# Patient Record
Sex: MALE | Race: WHITE
[De-identification: names, ages, dates, MRNs, and addresses within clinical notes are randomized per-mention and may not be internally consistent; named-entity substitution may affect disease eponyms.]

---

## 2019-07-24 ENCOUNTER — HOSPITAL ENCOUNTER (OUTPATIENT)
Dept: HOSPITAL 75 - RAD | Age: 49
End: 2019-07-24
Attending: SURGERY
Payer: MEDICARE

## 2019-07-24 ENCOUNTER — HOSPITAL ENCOUNTER (OUTPATIENT)
Dept: HOSPITAL 75 - PREOP | Age: 49
Discharge: HOME | End: 2019-07-24
Attending: SURGERY
Payer: MEDICARE

## 2019-07-24 VITALS — WEIGHT: 160 LBS | HEIGHT: 67 IN | BODY MASS INDEX: 25.11 KG/M2

## 2019-07-24 DIAGNOSIS — Z01.818: Primary | ICD-10-CM

## 2019-07-24 DIAGNOSIS — K82.8: Primary | ICD-10-CM

## 2019-07-24 PROCEDURE — 76705 ECHO EXAM OF ABDOMEN: CPT

## 2019-07-24 NOTE — DIAGNOSTIC IMAGING REPORT
INDICATION:  Right upper quadrant abdominal pain.



TECHNIQUE: Multiple grayscale sonographic images were obtained of

the right upper quadrant of the abdomen.



CORRELATION STUDY:  None



FINDINGS: 

LIVER:  There is uniform echotexture within the visualized

portions of the liver.  Liver length is 16.2 cm.     

GALLBLADDER:  Echogenic foci within the gallbladder lumen. This

does not demonstrate appreciable enhancement may reflect small

amount of sludge or debris. Gallstone not excluded. There is

presence of gallbladder wall thickening at 4 mm. 

COMMON BILE DUCT:  Nondilated at 3 mm.  

PANCREAS:  Largely obscured.

RIGHT KIDNEY:  Measures 11.0 x 6.7 x 6.1 cm cm.  No

hydronephrosis. 

AORTA/IVC:  Not well visualized.

OTHER:   None.





IMPRESSION: 

1. Question of potential the gallstone versus sludge or debris.

There is presence of gallbladder wall thickening. Some of this

may be attributed to slightly contracted gallbladder. Gallbladder

wall edema with cholecystitis however is not excluded. If further

assessment is desired, HIDA scan would be recommended.







Dictated by: 



  Dictated on workstation # [id number]

## 2019-07-31 ENCOUNTER — HOSPITAL ENCOUNTER (OUTPATIENT)
Dept: HOSPITAL 75 - SDC | Age: 49
Discharge: HOME | End: 2019-07-31
Attending: SURGERY
Payer: MEDICARE

## 2019-07-31 VITALS — DIASTOLIC BLOOD PRESSURE: 83 MMHG | SYSTOLIC BLOOD PRESSURE: 141 MMHG

## 2019-07-31 VITALS — DIASTOLIC BLOOD PRESSURE: 85 MMHG | SYSTOLIC BLOOD PRESSURE: 133 MMHG

## 2019-07-31 VITALS — SYSTOLIC BLOOD PRESSURE: 134 MMHG | DIASTOLIC BLOOD PRESSURE: 84 MMHG

## 2019-07-31 VITALS — SYSTOLIC BLOOD PRESSURE: 133 MMHG | DIASTOLIC BLOOD PRESSURE: 85 MMHG

## 2019-07-31 VITALS — DIASTOLIC BLOOD PRESSURE: 84 MMHG | SYSTOLIC BLOOD PRESSURE: 143 MMHG

## 2019-07-31 VITALS — SYSTOLIC BLOOD PRESSURE: 129 MMHG | DIASTOLIC BLOOD PRESSURE: 93 MMHG

## 2019-07-31 VITALS — SYSTOLIC BLOOD PRESSURE: 134 MMHG | DIASTOLIC BLOOD PRESSURE: 85 MMHG

## 2019-07-31 VITALS — DIASTOLIC BLOOD PRESSURE: 87 MMHG | SYSTOLIC BLOOD PRESSURE: 142 MMHG

## 2019-07-31 VITALS — SYSTOLIC BLOOD PRESSURE: 136 MMHG | DIASTOLIC BLOOD PRESSURE: 86 MMHG

## 2019-07-31 VITALS — SYSTOLIC BLOOD PRESSURE: 135 MMHG | DIASTOLIC BLOOD PRESSURE: 81 MMHG

## 2019-07-31 VITALS — SYSTOLIC BLOOD PRESSURE: 137 MMHG | DIASTOLIC BLOOD PRESSURE: 86 MMHG

## 2019-07-31 VITALS — HEIGHT: 67 IN | BODY MASS INDEX: 25.11 KG/M2 | WEIGHT: 160 LBS

## 2019-07-31 VITALS — SYSTOLIC BLOOD PRESSURE: 140 MMHG | DIASTOLIC BLOOD PRESSURE: 84 MMHG

## 2019-07-31 DIAGNOSIS — I85.00: ICD-10-CM

## 2019-07-31 DIAGNOSIS — Z80.0: ICD-10-CM

## 2019-07-31 DIAGNOSIS — K74.60: ICD-10-CM

## 2019-07-31 DIAGNOSIS — K64.1: ICD-10-CM

## 2019-07-31 DIAGNOSIS — Z79.899: ICD-10-CM

## 2019-07-31 DIAGNOSIS — K80.10: ICD-10-CM

## 2019-07-31 DIAGNOSIS — Z12.11: Primary | ICD-10-CM

## 2019-07-31 DIAGNOSIS — K29.50: ICD-10-CM

## 2019-07-31 DIAGNOSIS — E03.9: ICD-10-CM

## 2019-07-31 DIAGNOSIS — I10: ICD-10-CM

## 2019-07-31 DIAGNOSIS — Z79.891: ICD-10-CM

## 2019-07-31 DIAGNOSIS — D36.0: ICD-10-CM

## 2019-07-31 DIAGNOSIS — K64.8: ICD-10-CM

## 2019-07-31 DIAGNOSIS — K21.9: ICD-10-CM

## 2019-07-31 LAB
BASOPHILS # BLD AUTO: 0 10^3/UL (ref 0–0.1)
BASOPHILS NFR BLD AUTO: 0 % (ref 0–10)
EOSINOPHIL # BLD AUTO: 0 10^3/UL (ref 0–0.3)
EOSINOPHIL NFR BLD AUTO: 0 % (ref 0–10)
ERYTHROCYTE [DISTWIDTH] IN BLOOD BY AUTOMATED COUNT: 13 % (ref 10–14.5)
HCT VFR BLD CALC: 27 % (ref 40–54)
HCT VFR BLD CALC: 33 % (ref 40–54)
HGB BLD-MCNC: 12.2 G/DL (ref 13.3–17.7)
HGB BLD-MCNC: 9.7 G/DL (ref 13.3–17.7)
LYMPHOCYTES # BLD AUTO: 0.9 X 10^3 (ref 1–4)
LYMPHOCYTES NFR BLD AUTO: 15 % (ref 12–44)
MANUAL DIFFERENTIAL PERFORMED BLD QL: NO
MCH RBC QN AUTO: 37 PG (ref 25–34)
MCHC RBC AUTO-ENTMCNC: 37 G/DL (ref 32–36)
MCV RBC AUTO: 100 FL (ref 80–99)
MONOCYTES # BLD AUTO: 0.7 X 10^3 (ref 0–1)
MONOCYTES NFR BLD AUTO: 11 % (ref 0–12)
NEUTROPHILS # BLD AUTO: 4.4 X 10^3 (ref 1.8–7.8)
NEUTROPHILS NFR BLD AUTO: 74 % (ref 42–75)
PLATELET # BLD: 93 10^3/UL (ref 130–400)
PMV BLD AUTO: 13.1 FL (ref 7.4–10.4)
WBC # BLD AUTO: 6 10^3/UL (ref 4.3–11)

## 2019-07-31 PROCEDURE — 85025 COMPLETE CBC W/AUTO DIFF WBC: CPT

## 2019-07-31 PROCEDURE — 85014 HEMATOCRIT: CPT

## 2019-07-31 PROCEDURE — 36415 COLL VENOUS BLD VENIPUNCTURE: CPT

## 2019-07-31 PROCEDURE — 88342 IMHCHEM/IMCYTCHM 1ST ANTB: CPT

## 2019-07-31 PROCEDURE — 88304 TISSUE EXAM BY PATHOLOGIST: CPT

## 2019-07-31 PROCEDURE — 87081 CULTURE SCREEN ONLY: CPT

## 2019-07-31 PROCEDURE — 88305 TISSUE EXAM BY PATHOLOGIST: CPT

## 2019-07-31 PROCEDURE — 43239 EGD BIOPSY SINGLE/MULTIPLE: CPT

## 2019-07-31 PROCEDURE — 47562 LAPAROSCOPIC CHOLECYSTECTOMY: CPT

## 2019-07-31 PROCEDURE — 85018 HEMOGLOBIN: CPT

## 2019-07-31 RX ADMIN — SODIUM CHLORIDE, SODIUM LACTATE, POTASSIUM CHLORIDE, AND CALCIUM CHLORIDE PRN MLS/HR: 600; 310; 30; 20 INJECTION, SOLUTION INTRAVENOUS at 10:22

## 2019-07-31 RX ADMIN — SODIUM CHLORIDE, SODIUM LACTATE, POTASSIUM CHLORIDE, AND CALCIUM CHLORIDE PRN MLS/HR: 600; 310; 30; 20 INJECTION, SOLUTION INTRAVENOUS at 08:44

## 2019-07-31 NOTE — OPERATIVE REPORT
DATE OF SERVICE:  07/31/2019



ATTENDING PRIMARY CARE PHYSICIAN:

Dr. Merlin Hernadez.



PREOPERATIVE DIAGNOSES:

1.  Symptomatic chronic calculous cholecystitis.

2.  Gastroesophageal reflux disease, screening colonoscopy.



POSTOPERATIVE DIAGNOSES:

Chronic calculous cholecystitis, diffuse liver cirrhosis, grade III esophageal

varices, moderate to severe gastritis, mild chronic stage II external and

internal hemorrhoids.



PROCEDURE:

Laparoscopic cholecystectomy, EGD with biopsy, colonoscopy.



SURGEON:

Vanna Erazo MD



ANESTHESIA:

Conscious sedation.



ESTIMATED BLOOD LOSS:

900 mL.



FINDINGS:

Diffuse liver cirrhosis, multiple gallstones, grade III esophageal varices,

moderate to severe gastritis.  No active bleeding.  Chronic stage II external

and internal hemorrhoids.  Remainder of the colon and rectum were normal.



DISPOSITION:

The patient tolerated the procedure well.



INDICATIONS:

The patient is a 49-year-old male referred over to us for pain in the right

upper abdominal quadrant with associated intermittent episodes of nausea and

vomiting.  He also does report a history of gastroesophageal reflux disease.  An

ultrasound was performed, which did show biliary sludge.  He also is in need of

a screening colonoscopy and has not had one before up to this point in his life.

 Of note, this gentleman does drink a significant amount of alcohol and reports

that he has drank approximately 20 beers daily for the past 30 years.



DESCRIPTION OF PROCEDURE:

The patient was brought to the operating room, laid supine on the table.  After

adequate IV pain and sedative medications and general endotracheal intubation,

the abdomen was prepped and draped in standard surgical fashion.  Left upper

abdominal quadrant and a transverse skin incision made using a 15 blade.  An 0

silk suture was applied to the medial aspect of the incision for retraction and

a Veress needle inserted with a low opening pressure of 0 mmHg and the abdomen

was then insufflated to 15 mmHg pressure.  The Veress needle removed and a 5 mm

XL trocar placed followed by a 5 mm 45-degree angle laparoscope visualizing the

peritoneal cavity.



A 4-quadrant abdominal exploration was performed.  There was diffuse liver,

micronodular liver cirrhosis.  There was slightly distended gallbladder, no

gallbladder wall thickening.  Under direct visualization, we then proceeded to

place a supraumbilical 10 mm port after the skin an peritoneal lining were 
anesthetized

using 0.5% Marcaine with epinephrine and a transverse skin incision made using a

15 blade.  In a similar manner, a right upper abdominal quadrant 5 mm port was

placed.



The patient was then placed in the reverse Trendelenburg position as well as

plane right side up, left side down.  The fundus of the gallbladder was then

retracted anteriorly and superiorly.  The hepatoduodenal ligament was then

opened and dissected using blunt dissection as well as electrocautery on the

hook instrument.  The entire critical view of safety was identified including

the triangle of Calot as well as the cystic duct and artery as the only two

structures going into the gallbladder as well as the cystic plate behind the

proximal gallbladder.  While dissecting the cystic artery, we did run into

bleeding, which was controlled with a clip and fibrin glue.  After bleeding,

good hemostasis was observed.  We proceeded with our dissection of the clipping

of the cystic duct and artery proximally, distally and cutting them with

EndoShears.  The gallbladder was then dissected off the liver bed using cautery

on the hook instrument with visualization of good hemostasis as well as no

leaking ducts of Luschka.  The gallbladder was removed through the 10 mm port

site using an EndoCatch bag.  Approximately 900 mL of blood loss was encompassed

which was suctioned out.  Post-procedure, CBC was within stable limits at 9.7.



The gallbladder was removed through the 10 mm port site using an EndoCatch bag. 

The gallbladder was examined ex-vivo on the back table with small stones

identified.  The 10 mm port site fascia and peritoneum were then closed under

direct visualization using a Matheus-Kristine device and 0 Vicryl suture.  The

abdomen was then desufflated and remaining ports removed.  All skin incisions

were closed using 4-0 Monocryl running subcuticular sutures.  Wounds were then

cleaned and covered with Dermabond.



We then proceeded with the EGD.  The mouthpiece was applied.  The endoscope was

placed in the mouth, visualizing the pharynx and hypopharyngeal region.  Vocal

cords, epiglottis and vallecula identified and appeared to be normal.  The

endoscope was then gently intubated at the esophageal opening and esophagus

insufflated.  The endoscope was then advanced to the first, second and third

portion of esophagus.  At the distal esophagus, there were grade III esophageal

varices identified.  These were not actively bleeding.  The endoscope was placed

past the lower esophagus and the endoscope retroflexed visualizing no hiatal

hernia.  There was a mild to moderate gastritis.  No formal ulcerations, polyps

or any neoplasms.  A biopsy was taken of the antrum to rule out H. pylori with

visualization of good hemostasis.  The endoscope was then advanced to the

pylorus and the first and second portion of the duodenum, which appeared normal

with no ulcerations or any distal obstructions.  The endoscope was then slowly

withdrawn while taking a second look and suctioning of residual air with no

additional findings.



Under the same anesthesia, we then proceeded with the colonoscopy portion of the

procedure and the patient was placed in frog leg position.  A digital rectal

examination was performed, which revealed chronic stage II external and internal

hemorrhoids, not actively edematous nor inflamed and no bleeding.  Normal

sphincter tone was felt and there were no palpable masses.  Prostate gland was

palpable and appeared normal.



The endoscope was then intubated to the anus and rectum gently insufflated.  The

endoscope was then advanced to the valves of Richards of the rectum with no

polyps or any neoplasms identified.  We then proceeded through the sigmoid colon

where no diverticulosis identified.  The endoscope was then advanced to the

remainder of the descending, transverse and ascending colon to the cecum.  These

segments were normal.  There were no polyps or any neoplasms identified

throughout the colon or rectum.  The endoscope was then slowly withdrawn while

taking a second look and suctioning of residual air with no additional findings.



The patient tolerated the procedure well.  We will recommend that he proceed

with further medical therapy for his liver cirrhosis encompassing alcohol

cessation as well as being referred to hepatology for delineation of modified

Child-Diehl classification as well as his MELD score.  He does have significant

grade III esophageal varices as well, which will need to be further evaluated

and potentially decompressed with a portosystemic shunt.  If he does abstain

from alcohol and he does meet the criteria for liver transplantation, he may

potentially be placed on a list. 





Job ID: [id number]

DocumentID: [id number]

Dictated Date:  07/31/2019 15:30:14

Transcription Date: 07/31/2019 20:17:34

Dictated By: VANNA ERAZO MD

MTDD

## 2019-07-31 NOTE — ANESTHESIA-GENERAL POST-OP
General


Patient Condition


Mental Status/LOC:  Same as Preop


Cardiovascular:  Satisfactory


Nausea/Vomiting:  Absent


Respiratory:  Satisfactory


Pain:  Controlled


Complications:  Absent





Post Op Complications


Complications


None





Follow Up Care/Instructions


Patient Instructions


None needed.





Anesthesia/Patient Condition


Patient Condition


Patient is doing well, C/O some pain which is expected, stable vital signs, no 

apparent adverse anesthesia problems.











SHILO CLARKE DO         Jul 31, 2019 14:05

## 2019-07-31 NOTE — PROGRESS NOTE-POST OPERATIVE
Post-Operative Progess Note


Surgeon (s)/Assistant (s)


Surgeon


Dr. Herbert Erazo M.D.


Assistant:  Cruz Clement APRN





Pre-Operative Diagnosis


Chronic Calculous cholecystitis, Reflux, Screening colonoscopy





Post-Operative Diagnosis





Chronic Calculous cholecystitis, Grade III esophageal Varices, moderate to


severe gastritis, Chronic stage II external and internal hemorrhoids





Procedure & Operative Findings


Date of Procedure


7/31/19


Procedure Performed/Findings


Laparoscopic cholecystectomy, EGD with biopsy, colonoscopy


Anesthesia Type


GET





Estimated Blood Loss


Estimated blood loss (mL):  900 ml





Specimens/Packing


Specimens Removed


1) Gallbladder


2) Antrum











CRUZ CLEMENT APRN          Jul 31, 2019 11:47
90

## 2019-07-31 NOTE — DISCHARGE INST-SURGICAL
D/C Lap Instructions-KIDO


Reconcile Patient Problems


Problems Reviewed?:  Yes


New, Converted, or Re-Newed RX:  RX on Chart


Follow Up Appt in 2 weeks





Activity as tolerated


No driving for 24 hours


No driving while on pain medications





Incentive Spirometry use every 2 hours while awake





Regular Diet


High Fiber Diet 25g or more per day


Avoid Alcohol, Caffeine, Spicy Greasy and Acid foods.


Drink 64 fluid oz or more of fluids per day.





Symptoms to Report: Fever over 101 degree F, Nausea/Vomiting 


Infection Signs and Symptoms to report:  Increased redness, Foul odor of wound, 

Increased drainage





Bathing instructions: May shower


Operative Area Clean/Dry;  Keep incision clean/dry





Symptoms to Report: Fever over 101 degree F, Nausea/Vomiting 


If any problems/questions: Contact your physician or go to Emergency Room











STEFAN CLEMENT          Jul 31, 2019 09:29

## 2019-07-31 NOTE — PROGRESS NOTE-PRE OPERATIVE
Pre-Operative Progress Note


H&P Reviewed


The H&P was reviewed, patient examined and no changes noted.


Date Seen by Provider:  Jul 31, 2019


Time Seen by Provider:  09:20


Date H&P Reviewed:  Jul 31, 2019


Time H&P Reviewed:  09:15


Pre-Operative Diagnosis:  Chronic Calculous cholecystitis, Reflux, Screening 

colonoscopy











STEFAN CLEMENT          Jul 31, 2019 09:24
no

## 2019-10-04 ENCOUNTER — HOSPITAL ENCOUNTER (OUTPATIENT)
Dept: HOSPITAL 75 - RAD FS | Age: 49
End: 2019-10-04
Attending: FAMILY MEDICINE
Payer: MEDICARE

## 2019-10-04 DIAGNOSIS — K70.30: Primary | ICD-10-CM

## 2019-10-04 DIAGNOSIS — K63.89: ICD-10-CM

## 2019-10-04 PROCEDURE — 74022 RADEX COMPL AQT ABD SERIES: CPT

## 2020-10-21 ENCOUNTER — HOSPITAL ENCOUNTER (EMERGENCY)
Dept: HOSPITAL 75 - ER FS | Age: 50
Discharge: HOME | End: 2020-10-21
Payer: MEDICARE

## 2020-10-21 VITALS — DIASTOLIC BLOOD PRESSURE: 78 MMHG | SYSTOLIC BLOOD PRESSURE: 161 MMHG

## 2020-10-21 VITALS — BODY MASS INDEX: 25.47 KG/M2 | HEIGHT: 66.02 IN | WEIGHT: 158.51 LBS

## 2020-10-21 DIAGNOSIS — E03.9: ICD-10-CM

## 2020-10-21 DIAGNOSIS — H40.9: ICD-10-CM

## 2020-10-21 DIAGNOSIS — Z79.890: ICD-10-CM

## 2020-10-21 DIAGNOSIS — K21.9: ICD-10-CM

## 2020-10-21 DIAGNOSIS — R10.9: Primary | ICD-10-CM

## 2020-10-21 DIAGNOSIS — F17.210: ICD-10-CM

## 2020-10-21 DIAGNOSIS — E87.1: ICD-10-CM

## 2020-10-21 DIAGNOSIS — I10: ICD-10-CM

## 2020-10-21 DIAGNOSIS — Z87.19: ICD-10-CM

## 2020-10-21 DIAGNOSIS — K52.9: ICD-10-CM

## 2020-10-21 DIAGNOSIS — F10.20: ICD-10-CM

## 2020-10-21 LAB
ALBUMIN SERPL-MCNC: 4.7 GM/DL (ref 3.2–4.5)
ALP SERPL-CCNC: 93 U/L (ref 40–136)
ALT SERPL-CCNC: 99 U/L (ref 0–55)
APTT PPP: YELLOW S
BACTERIA #/AREA URNS HPF: (no result) /HPF
BARBITURATES UR QL: NEGATIVE
BASOPHILS # BLD AUTO: 0 10^3/UL (ref 0–0.1)
BASOPHILS NFR BLD AUTO: 0 % (ref 0–10)
BENZODIAZ UR QL SCN: NEGATIVE
BILIRUB SERPL-MCNC: 1.6 MG/DL (ref 0.1–1)
BILIRUB UR QL STRIP: NEGATIVE
BUN/CREAT SERPL: 8
CALCIUM SERPL-MCNC: 9.9 MG/DL (ref 8.5–10.1)
CHLORIDE SERPL-SCNC: 91 MMOL/L (ref 98–107)
CO2 SERPL-SCNC: 22 MMOL/L (ref 21–32)
COCAINE UR QL: NEGATIVE
CREAT SERPL-MCNC: 0.75 MG/DL (ref 0.6–1.3)
EOSINOPHIL # BLD AUTO: 0 10^3/UL (ref 0–0.3)
EOSINOPHIL NFR BLD AUTO: 1 % (ref 0–10)
FIBRINOGEN PPP-MCNC: CLEAR MG/DL
GFR SERPLBLD BASED ON 1.73 SQ M-ARVRAT: > 60 ML/MIN
GLUCOSE SERPL-MCNC: 121 MG/DL (ref 70–105)
GLUCOSE UR STRIP-MCNC: NEGATIVE MG/DL
HCT VFR BLD CALC: 38 % (ref 40–54)
HGB BLD-MCNC: 13.6 G/DL (ref 13.3–17.7)
KETONES UR QL STRIP: NEGATIVE
LEUKOCYTE ESTERASE UR QL STRIP: NEGATIVE
LIPASE SERPL-CCNC: 26 U/L (ref 8–78)
LYMPHOCYTES # BLD AUTO: 0.7 X 10^3 (ref 1–4)
LYMPHOCYTES NFR BLD AUTO: 23 % (ref 12–44)
MANUAL DIFFERENTIAL PERFORMED BLD QL: NO
MCH RBC QN AUTO: 35 PG (ref 25–34)
MCHC RBC AUTO-ENTMCNC: 36 G/DL (ref 32–36)
MCV RBC AUTO: 98 FL (ref 80–99)
METHADONE UR QL SCN: NEGATIVE
METHAMPHETAMINE SCREEN URINE S: NEGATIVE
MONOCYTES # BLD AUTO: 0.3 X 10^3 (ref 0–1)
MONOCYTES NFR BLD AUTO: 9 % (ref 0–12)
NEUTROPHILS # BLD AUTO: 2 X 10^3 (ref 1.8–7.8)
NEUTROPHILS NFR BLD AUTO: 66 % (ref 42–75)
NITRITE UR QL STRIP: NEGATIVE
OPIATES UR QL SCN: NEGATIVE
OXYCODONE UR QL: NEGATIVE
PH UR STRIP: 8 [PH] (ref 5–9)
PLATELET # BLD: 83 10^3/UL (ref 130–400)
PMV BLD AUTO: 11.4 FL (ref 7.4–10.4)
POTASSIUM SERPL-SCNC: 4.1 MMOL/L (ref 3.6–5)
PROPOXYPH UR QL: NEGATIVE
PROT SERPL-MCNC: 7.3 GM/DL (ref 6.4–8.2)
PROT UR QL STRIP: NEGATIVE
RBC #/AREA URNS HPF: (no result) /HPF
SODIUM SERPL-SCNC: 126 MMOL/L (ref 135–145)
SP GR UR STRIP: 1.01 (ref 1.02–1.02)
SQUAMOUS #/AREA URNS HPF: (no result) /HPF
TRICYCLICS UR QL SCN: NEGATIVE
WBC # BLD AUTO: 3 10^3/UL (ref 4.3–11)
WBC #/AREA URNS HPF: (no result) /HPF

## 2020-10-21 PROCEDURE — 83690 ASSAY OF LIPASE: CPT

## 2020-10-21 PROCEDURE — 80053 COMPREHEN METABOLIC PANEL: CPT

## 2020-10-21 PROCEDURE — 36415 COLL VENOUS BLD VENIPUNCTURE: CPT

## 2020-10-21 PROCEDURE — 80320 DRUG SCREEN QUANTALCOHOLS: CPT

## 2020-10-21 PROCEDURE — 80306 DRUG TEST PRSMV INSTRMNT: CPT

## 2020-10-21 PROCEDURE — 81000 URINALYSIS NONAUTO W/SCOPE: CPT

## 2020-10-21 PROCEDURE — 74176 CT ABD & PELVIS W/O CONTRAST: CPT

## 2020-10-21 PROCEDURE — 85025 COMPLETE CBC W/AUTO DIFF WBC: CPT

## 2020-10-21 NOTE — DIAGNOSTIC IMAGING REPORT
PROCEDURE: CT urinary tract, rule out kidney stone.



TECHNIQUE: Multiple contiguous axial images were obtained through

the abdomen and pelvis without the use of intravenous contrast.

Auto Exposure Controls were utilized during the CT exam to meet

ALARA standards for radiation dose reduction. 



INDICATION: Right flank pain.



COMPARISON: There are no prior CT examinations available for

comparison.



FINDINGS: There is no evidence for nephrolithiasis or

urolithiasis and the kidneys do not appear to be obstructed.

There is no solid renal mass identified but there does seem to be

a 2.8 cm cyst along the lateral aspect of the left kidney.



The appendix was not well visualized but there are no indirect

signs of acute appendicitis. The wall of the cecum and ascending

colon does seem somewhat thickened. This may be secondary to

incomplete distention as there is no distortion of the

pericolonic fat in this area to suggest colitis. Even so, the

possibility of mild colitis should still be considered.



There is a small amount of nonspecific free fluid in the pelvis.

This is of uncertain etiology. The bladder wall is thickened and

this appearance could be secondary to either cystitis or

incomplete distention. Correlation with the patient's urinalysis

would be recommended. The prostate gland does not appear to be

enlarged.



The liver, spleen, pancreas, adrenals, aorta, and inferior vena

cava show no sign of an acute abnormality. The gallbladder is

surgically absent. The stomach is not well distended and

difficult to assess. There does seem to be a small hiatal hernia.



The lung bases are clear.



The bone windows are unremarkable for a fracture or for a

destructive lesion. There is fairly severe degenerative disc and

bony disease at L5-S1.



IMPRESSION:

1. There is no evidence for nephrolithiasis or urolithiasis and

the kidneys do not appear to be obstructed.

2. The appendix is not well visualized but there are no indirect

signs of acute appendicitis.

3. The slightly thickened appearance of the wall of the cecum and

ascending colon may be secondary to incomplete distention. The

possibility that there is an element of mild colitis should also

be considered.

4. There is a small amount of nonspecific free fluid in the

pelvis.

5. The bladder wall thickening may also be related to incomplete

distention or less likely to cystitis.



Dictated by: 



  Dictated on workstation # [id number]

## 2020-10-21 NOTE — ED GENERAL
General


Stated Complaint:  BACK PAIN


Source of Information:  Patient, Old Records, RN/MD, RN Notes Reviewed





History of Present Illness


Date Seen by Provider:  Oct 21, 2020


Time Seen by Provider:  09:45


Initial Comments


This patient is a 50-year-old male presents to the emergency department 

complaining of right flank pain radiating to the right groin. Patient states he 

awakened this morning with this pain. Patient admits that he is a heavy drinker 

has been drinking most of his life is considered an alcoholic. Last drink was 

last night. Patient states he never had pain like this before. Patient denies 

any injury. Patient denies any fever. We'll do medical evaluation treatment is 

needed. Patient states she does have a history of liver problems concerning for 

possible cirrhosis of the liver.


Severity:  Moderate


Associated Systoms:  No Denies Symptoms, No Chest Pain, No Cough, No 

Diaphoresis, No Fever/Chills, No Headaches, No Loss of Appetite, No Malaise, No 

Nausea/Vomiting, No Rash, No Seizure, No Shortness of Air, No Syncope, No 

Weakness, No Other





Allergies and Home Medications


Allergies


Coded Allergies:  


     No Known Drug Allergies (Unverified , 7/24/19)





Home Medications


Bimatoprost 2.5 Ml Drops, 1 DROP OU DAILY, (Reported)


Hydrocodone Bit/Acetaminophen 1 Each Tablet, 1-2 TAB PO Q4H


   Prescribed by: STEFAN CLEMENT on 7/31/19 0926


Levothyroxine Sodium 50 Mcg Tablet, 50 MCG PO DAILY, (Reported)


Lisinopril/Hydrochlorothiazide 1 Each Tablet, 2 EACH PO DAILY, (Reported)


Pantoprazole Sodium 40 Mg Tablet.dr, 40 MG PO DAILY


   Prescribed by: STEFAN CLEMENT on 7/31/19 1149





Patient Home Medication List


Home Medication List Reviewed:  Yes





Review of Systems


Review of Systems


Constitutional:  No no symptoms reported, No see HPI, No chills, No diaphoresis,

No dizziness, No fever, No malaise, No weakness, No weight gain, No weight loss,

No other


EENTM:  No see HPI, No no symptoms reported, No ear discharge, No hearing loss, 

No ear pain, No blurred vision, No double vision, No eye pain, No tearing, No 

vision loss, No dental problems, No hoarseness, No mouth pain, No mouth 

swelling, No epistaxis, No nose congestion, No nose pain, No throat pain, No 

throat swelling, No other


Respiratory:  No no symptoms reported, No see HPI, No cough, No dyspnea on 

exertion, No hemoptysis, No orthopnea, No phlegm, No short of breath, No 

stridor, No wheezing, No other


Cardiovascular:  No no symptoms reported, No see HPI, No chest pain, No edema, 

No Hx of Intervention, No palpitations, No syncope, No vascular heart diseas, No

other


Gastrointestinal:  No RUQ, No LUQ, No RLQ, No LLQ, No no symptoms reported, No 

see HPI, No abdominal pain, No constipation, No diarrhea, No dysphagia, No 

hematemesis, No heartburn, No jaundice, No loss of appetite, No melena, No naus

ea, No vomiting; other (flank pain)


Genitourinary:  No no symptoms reported, No see HPI, No decreased output, No 

discharge, No dysuria, No frequency, No hematuria, No hesitancy, No 

incontinence, No nocturia, No pain, No other


Musculoskeletal:  No no symptoms reported; see HPI, back pain; No gout, No joint

pain, No joint swelling, No muscle pain, No muscle stiffness, No muscle cramps, 

No muscle twitching, No muscle weakness, No neck pain, No other


Skin:  No no symptoms reported, No see HPI, No change in color, No change in 

hair/nails, No dryness, No hx of skin cancer, No lesions, No lumps, No pruritus,

No rash, No other





All Other Systems Reviewed


Negative Unless Noted:  Yes





Past Medical-Social-Family Hx


Patient Social History


Alcohol Beverage of Choice:  Beer


Type Used:  Cigarettes


Former Smoker, Quit:  Jul 1, 2018


Recent Foreign Travel:  No


Contact w/Someone Who Travel:  No


Recent Hopitalizations:  No





Seasonal Allergies


Seasonal Allergies:  No





Past Medical History


Surgeries:  Yes (R knee scope, back sx)


Respiratory:  No


Cardiac:  Yes


Hypertension


Neurological:  No


Genitourinary:  No


Gastrointestinal:  Yes (epigastric pain)


Gastroesophageal Reflux


Musculoskeletal:  No


Endocrine:  Yes


Hypothyroidsim


HEENT:  Yes


Glaucoma


Cancer:  No


Psychosocial:  No


Integumentary:  No


Blood Disorders:  No





Physical Exam


Vital Signs





Vital Signs - First Documented








 10/21/20





 09:48


 


Temp 36.6


 


Pulse 77


 


Resp 18


 


B/P (MAP) 179/100 (126)


 


Pulse Ox 99


 


O2 Delivery Room Air





Capillary Refill :


Height, Weight, BMI


Height: 5'7.00"


Weight: 160lbs. 0.0oz. [id number]; 25.1 BMI


Method:


General Appearance:  No Apparent Distress, WD/WN


Neck:  Full Range of Motion, Normal Inspection, Non Tender, Supple, Carotid 

Bruit


Respiratory:  Chest Non Tender, Lungs Clear, Normal Breath Sounds, No Accessory 

Muscle Use, No Respiratory Distress


Cardiovascular:  Regular Rate, Rhythm, No Edema, No Gallop, No JVD, No Murmur, 

Normal Peripheral Pulses


Gastrointestinal:  Normal Bowel Sounds, No Organomegaly, No Pulsatile Mass, 

Soft, Tenderness


Back:  Normal Inspection, No Vertebral Tenderness, CVA Tenderness (R)


Neurologic/Psychiatric:  Alert, Oriented x3, No Motor/Sensory Deficits, Normal 

Mood/Affect


Skin:  Normal Color, Warm/Dry





Progress/Results/Core Measures


Suspected Sepsis


SIRS


Temperature: 


Pulse:  


Respiratory Rate: 


 


Laboratory Tests


10/21/20 09:55: White Blood Count 3.0L


Blood Pressure  / 


Mean: 


 











Laboratory Tests


10/21/20 09:55: 


Creatinine 0.75, Platelet Count 83L, Total Bilirubin 1.6H





Results/Orders


Lab Results





Laboratory Tests








Test


 10/21/20


09:48 10/21/20


09:55 Range/Units


 


 


Urine Color YELLOW    


 


Urine Clarity CLEAR    


 


Urine pH 8.0   5-9  


 


Urine Specific Gravity 1.015 L  1.016-1.022  


 


Urine Protein NEGATIVE   NEGATIVE  


 


Urine Glucose (UA) NEGATIVE   NEGATIVE  


 


Urine Ketones NEGATIVE   NEGATIVE  


 


Urine Nitrite NEGATIVE   NEGATIVE  


 


Urine Bilirubin NEGATIVE   NEGATIVE  


 


Urine Urobilinogen 4.0   < = 1.0  MG/DL


 


Urine Leukocyte Esterase NEGATIVE   NEGATIVE  


 


Urine RBC (Auto) NEGATIVE   NEGATIVE  


 


Urine RBC NONE    /HPF


 


Urine WBC NONE    /HPF


 


Urine Squamous Epithelial


Cells 0-2 


 


  /HPF





 


Urine Crystals NONE    /LPF


 


Urine Bacteria NONE    /HPF


 


Urine Casts NONE    /LPF


 


Urine Mucus NEGATIVE    /LPF


 


Urine Culture Indicated NO    


 


Urine Opiates Screen NEGATIVE   NEGATIVE  


 


Urine Oxycodone Screen NEGATIVE   NEGATIVE  


 


Urine Methadone Screen NEGATIVE   NEGATIVE  


 


Urine Propoxyphene Screen NEGATIVE   NEGATIVE  


 


Urine Barbiturates Screen NEGATIVE   NEGATIVE  


 


Ur Tricyclic Antidepressants


Screen NEGATIVE 


 


 NEGATIVE  





 


Urine Phencyclidine Screen NEGATIVE   NEGATIVE  


 


Urine Amphetamines Screen NEGATIVE   NEGATIVE  


 


Urine Methamphetamines Screen NEGATIVE   NEGATIVE  


 


Urine Benzodiazepines Screen NEGATIVE   NEGATIVE  


 


Urine Cocaine Screen NEGATIVE   NEGATIVE  


 


Urine Cannabinoids Screen POSITIVE H  NEGATIVE  


 


White Blood Count


 


 3.0 L


 4.3-11.0


10^3/uL


 


Red Blood Count


 


 3.84 L


 4.35-5.85


10^6/uL


 


Hemoglobin  13.6  13.3-17.7  G/DL


 


Hematocrit  38 L 40-54  %


 


Mean Corpuscular Volume  98  80-99  FL


 


Mean Corpuscular Hemoglobin  35 H 25-34  PG


 


Mean Corpuscular Hemoglobin


Concent 


 36 


 32-36  G/DL





 


Red Cell Distribution Width  12.2  10.0-14.5  %


 


Platelet Count


 


 83 L


 130-400


10^3/uL


 


Mean Platelet Volume  11.4 H 7.4-10.4  FL


 


Immature Granulocyte % (Auto)  0   %


 


Neutrophils (%) (Auto)  66  42-75  %


 


Lymphocytes (%) (Auto)  23  12-44  %


 


Monocytes (%) (Auto)  9  0-12  %


 


Eosinophils (%) (Auto)  1  0-10  %


 


Basophils (%) (Auto)  0  0-10  %


 


Neutrophils # (Auto)  2.0  1.8-7.8  X 10^3


 


Lymphocytes # (Auto)  0.7 L 1.0-4.0  X 10^3


 


Monocytes # (Auto)  0.3  0.0-1.0  X 10^3


 


Eosinophils # (Auto)


 


 0.0 


 0.0-0.3


10^3/uL


 


Basophils # (Auto)


 


 0.0 


 0.0-0.1


10^3/uL


 


Immature Granulocyte # (Auto)


 


 0.0 


 0.0-0.1


10^3/uL


 


Sodium Level  126 L 135-145  MMOL/L


 


Potassium Level  4.1  3.6-5.0  MMOL/L


 


Chloride Level  91 L   MMOL/L


 


Carbon Dioxide Level  22  21-32  MMOL/L


 


Anion Gap  13  5-14  MMOL/L


 


Blood Urea Nitrogen  6 L 7-18  MG/DL


 


Creatinine


 


 0.75 


 0.60-1.30


MG/DL


 


Estimat Glomerular Filtration


Rate 


 > 60 


  





 


BUN/Creatinine Ratio  8   


 


Glucose Level  121 H   MG/DL


 


Calcium Level  9.9  8.5-10.1  MG/DL


 


Corrected Calcium    8.5-10.1  MG/DL


 


Total Bilirubin  1.6 H 0.1-1.0  MG/DL


 


Aspartate Amino Transf


(AST/SGOT) 


 140 H


 5-34  U/L





 


Alanine Aminotransferase


(ALT/SGPT) 


 99 H


 0-55  U/L





 


Alkaline Phosphatase  93    U/L


 


Total Protein  7.3  6.4-8.2  GM/DL


 


Albumin  4.7 H 3.2-4.5  GM/DL


 


Lipase  26  8-78  U/L


 


Serum Alcohol  < 10  <10  MG/DL








My Orders





Orders - MIAH KARIMI MD


Ed Iv/Invasive Line Start (10/21/20 09:54)


Alcohol (10/21/20 09:54)


Cbc With Automated Diff (10/21/20 09:54)


Comprehensive Metabolic Panel (10/21/20 09:54)


Lipase (10/21/20 09:54)


Urinalysis (10/21/20 09:54)


Drug Screen Stat (Urine) (10/21/20 09:54)


Ct Abd/Pelvis Wo(Kidney Stone) (10/21/20 09:54)


Ns Iv 1000 Ml (Sodium Chloride 0.9%) (10/21/20 09:54)


Ondansetron Injection (Zofran Injectio (10/21/20 10:00)


Ketorolac Injection (Toradol Injection) (10/21/20 10:16)


Ketorolac Injection (Toradol Injection) (10/21/20 10:30)


Orphenadrine Inj (Ed Only) (Norflex Inje (10/21/20 10:30)





Medications Given in ED





Current Medications








 Medications  Dose


 Ordered  Sig/Jose


 Route  Start Time


 Stop Time Status Last Admin


Dose Admin


 


 Ketorolac


 Tromethamine  15 mg  ONCE  ONCE


 IVP  10/21/20 10:30


 10/21/20 10:31 DC 10/21/20 10:24


15 MG


 


 Ondansetron HCl  4 mg  ONCE  ONCE


 IVP  10/21/20 10:00


 10/21/20 10:01 DC 10/21/20 10:23


4 MG


 


 Orphenadrine


 Citrate  60 mg  ONCE  ONCE


 IM  10/21/20 10:30


 10/21/20 10:31 DC 10/21/20 10:35


60 MG








Vital Signs/I&O











 10/21/20





 09:48


 


Temp 36.6


 


Pulse 77


 


Resp 18


 


B/P (MAP) 179/100 (126)


 


Pulse Ox 99


 


O2 Delivery Room Air





Capillary Refill :


Progress Note :  


   Time:  11:03


Progress Note


ct abd








IMPRESSION:


1. There is no evidence for nephrolithiasis or urolithiasis and


the kidneys do not appear to be obstructed.


2. The appendix is not well visualized but there are no indirect


signs of acute appendicitis.


3. The thickened appearance of the wall of the cecum and


ascending colon may be secondary to incomplete distention. The


possibility that there is an element of mild colitis should also


be considered.


4. There is a small amount of nonspecific free fluid in the


pelvis.


5. The bladder wall thickening may also be related to incomplete


distention or less likely to cystitis.











I did discuss at length with patient about findings. Patient is encouraged to 

stop drinking. Patient will be placed on Flagyl and Cipro. Patient given 

diclofenac and needed for pain. Patient should maintain a clear liquid diet and 

advance diet slowly. Do not advance diet until pain free. Follow-up with PCP in 

2-3 days. If develops diarrhea patient should take Pepto-Bismol 

over-the-counter.





Departure


Impression





   Primary Impression:  


   Abdominal pain


   Additional Impressions:  


   Colitis


   Hyponatremia


   Alcoholism


   History of cirrhosis of liver


Disposition:  01 HOME, SELF-CARE


Condition:  Stable





Departure-Patient Inst.


Decision time for Depature:  11:05


Referrals:  


TIKA JONES DO (PCP/Family)


Primary Care Physician


Patient Instructions:  Colitis, Severe Abdominal Pain, Adult (DC)





Add. Discharge Instructions:  


Patient is encouraged to stop drinking. Patient will be placed on Flagyl and 

Cipro. Patient given diclofenac and needed for pain. Patient should maintain a 

clear liquid diet and advance diet slowly. Do not advance diet until pain free. 

Follow-up with PCP in 2-3 days. If develops diarrhea patient should take Pepto-

Bismol over-the-counter.


Scripts


Metronidazole (Flagyl) 500 Mg Tablet


500 MG PO TID, #21 TAB 0 Refills


   Prov: MIAH KARIMI MD         10/21/20 


Diclofenac Sodium (Diclofenac Sodium) 75 Mg Tablet.


75 MG PO BID for 10 Days, #20 TAB 0 Refills


   Prov: MIAH KARIMI MD         10/21/20 


Ciprofloxacin HCl (Ciprofloxacin HCl) 500 Mg Tablet


500 MG PO BID, #14 TAB


   Prov: MIAH KARIMI MD         10/21/20











MIAH KARIMI MD            Oct 21, 2020 09:57

## 2021-05-03 LAB
ALBUMIN SERPL-MCNC: 4.8 GM/DL (ref 3.2–4.5)
ALP SERPL-CCNC: 100 U/L (ref 40–136)
ALT SERPL-CCNC: 107 U/L (ref 0–55)
BASOPHILS # BLD AUTO: 0 10^3/UL (ref 0–0.1)
BASOPHILS NFR BLD AUTO: 1 % (ref 0–10)
BILIRUB SERPL-MCNC: 1.8 MG/DL (ref 0.1–1)
BUN/CREAT SERPL: 8
CALCIUM SERPL-MCNC: 9.6 MG/DL (ref 8.5–10.1)
CHLORIDE SERPL-SCNC: 93 MMOL/L (ref 98–107)
CO2 SERPL-SCNC: 21 MMOL/L (ref 21–32)
CREAT SERPL-MCNC: 0.78 MG/DL (ref 0.6–1.3)
EOSINOPHIL # BLD AUTO: 0 10^3/UL (ref 0–0.3)
EOSINOPHIL NFR BLD AUTO: 0 % (ref 0–10)
GFR SERPLBLD BASED ON 1.73 SQ M-ARVRAT: > 60 ML/MIN
GLUCOSE SERPL-MCNC: 96 MG/DL (ref 70–105)
HCT VFR BLD CALC: 38 % (ref 40–54)
HGB BLD-MCNC: 13.7 G/DL (ref 13.3–17.7)
LYMPHOCYTES # BLD AUTO: 0.6 10^3/UL (ref 1–4)
LYMPHOCYTES NFR BLD AUTO: 16 % (ref 12–44)
MANUAL DIFFERENTIAL PERFORMED BLD QL: NO
MCH RBC QN AUTO: 36 PG (ref 25–34)
MCHC RBC AUTO-ENTMCNC: 36 G/DL (ref 32–36)
MCV RBC AUTO: 100 FL (ref 80–99)
MONOCYTES # BLD AUTO: 0.4 10^3/UL (ref 0–1)
MONOCYTES NFR BLD AUTO: 11 % (ref 0–12)
NEUTROPHILS # BLD AUTO: 3 10^3/UL (ref 1.8–7.8)
NEUTROPHILS NFR BLD AUTO: 73 % (ref 42–75)
PLATELET # BLD: 91 10^3/UL (ref 130–400)
PMV BLD AUTO: 10.7 FL (ref 9–12.2)
POTASSIUM SERPL-SCNC: 4.3 MMOL/L (ref 3.6–5)
PROT SERPL-MCNC: 7.7 GM/DL (ref 6.4–8.2)
SODIUM SERPL-SCNC: 127 MMOL/L (ref 135–145)
WBC # BLD AUTO: 4.1 10^3/UL (ref 4.3–11)

## 2021-05-17 ENCOUNTER — HOSPITAL ENCOUNTER (OUTPATIENT)
Dept: HOSPITAL 75 - RAD FS | Age: 51
End: 2021-05-17
Attending: INTERNAL MEDICINE
Payer: MEDICARE

## 2021-05-17 DIAGNOSIS — D69.6: ICD-10-CM

## 2021-05-17 DIAGNOSIS — N28.1: ICD-10-CM

## 2021-05-17 DIAGNOSIS — R94.5: Primary | ICD-10-CM

## 2021-05-17 DIAGNOSIS — Z90.49: ICD-10-CM

## 2021-05-17 PROCEDURE — 74178 CT ABD&PLV WO CNTR FLWD CNTR: CPT

## 2021-05-17 NOTE — DIAGNOSTIC IMAGING REPORT
PROCEDURE: CT abdomen and pelvis with and without contrast.



TECHNIQUE: Precontrast acquisitions were acquired through the

abdomen and pelvis. Multiple contiguous axial images were

obtained through the abdomen and pelvis after the administration

of intravenous contrast. Auto Exposure Controls were utilized

during the CT exam to meet ALARA standards for radiation dose

reduction. 



INDICATION:  Abnormal liver function tests.



Correlation is made with prior CT from 10/21/2020.



Lung bases are clear. There appear to be multiple varices

surrounding the distal esophagus. The liver does demonstrate a

nodular contour suggestive of cirrhosis. No discrete liver mass

is detected. The gallbladder is surgically absent. No biliary

ductal dilatation is seen. Pancreas and spleen are unremarkable.

No adrenal mass is detected. Right kidney is unremarkable. Left

kidney contains a 3 cm cyst in lower pole. There is no

hydronephrosis. Aorta is nonaneurysmal. Bowel loops are normal

caliber. There is no obstruction. There is no free fluid

identified. The bladder and prostate are unremarkable. No

definite abdominal or pelvic lymphadenopathy is detected.



IMPRESSION:

1. Features consistent with cirrhosis. No discrete liver mass is

identified. 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

varices. No definite splenomegaly or ascites is identified. 

2. Left renal cyst.  

3. No other significant abnormality is detected.













Dictated by: 



  Dictated on workstation # [id number]

## 2021-05-25 ENCOUNTER — HOSPITAL ENCOUNTER (OUTPATIENT)
Dept: HOSPITAL 75 - ONC | Age: 51
LOS: 68 days | Discharge: HOME | End: 2021-08-01
Attending: INTERNAL MEDICINE
Payer: MEDICARE

## 2021-05-25 DIAGNOSIS — F10.20: ICD-10-CM

## 2021-05-25 DIAGNOSIS — K70.30: ICD-10-CM

## 2021-05-25 DIAGNOSIS — Z79.899: ICD-10-CM

## 2021-05-25 DIAGNOSIS — E03.9: ICD-10-CM

## 2021-05-25 DIAGNOSIS — D69.6: Primary | ICD-10-CM

## 2021-05-25 DIAGNOSIS — Z79.890: ICD-10-CM

## 2021-05-25 DIAGNOSIS — I10: ICD-10-CM

## 2021-05-25 PROCEDURE — 83540 ASSAY OF IRON: CPT

## 2021-05-25 PROCEDURE — 99213 OFFICE O/P EST LOW 20 MIN: CPT

## 2021-05-25 PROCEDURE — 83615 LACTATE (LD) (LDH) ENZYME: CPT

## 2021-05-25 PROCEDURE — 82728 ASSAY OF FERRITIN: CPT

## 2021-05-25 PROCEDURE — 83550 IRON BINDING TEST: CPT

## 2021-05-25 PROCEDURE — 99214 OFFICE O/P EST MOD 30 MIN: CPT

## 2021-05-25 PROCEDURE — 80053 COMPREHEN METABOLIC PANEL: CPT

## 2021-05-25 PROCEDURE — 85025 COMPLETE CBC W/AUTO DIFF WBC: CPT

## 2021-05-25 PROCEDURE — 82105 ALPHA-FETOPROTEIN SERUM: CPT

## 2021-09-20 ENCOUNTER — HOSPITAL ENCOUNTER (EMERGENCY)
Dept: HOSPITAL 75 - ER FS | Age: 51
Discharge: ANOTHER HOSPITAL | End: 2021-09-20
Payer: MEDICARE

## 2021-09-20 VITALS — BODY MASS INDEX: 28.91 KG/M2 | WEIGHT: 179.9 LBS | HEIGHT: 66.02 IN

## 2021-09-20 VITALS — DIASTOLIC BLOOD PRESSURE: 79 MMHG | SYSTOLIC BLOOD PRESSURE: 144 MMHG

## 2021-09-20 DIAGNOSIS — F10.20: ICD-10-CM

## 2021-09-20 DIAGNOSIS — E03.9: ICD-10-CM

## 2021-09-20 DIAGNOSIS — S72.001A: Primary | ICD-10-CM

## 2021-09-20 DIAGNOSIS — I10: ICD-10-CM
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
## 2021-09-20 DIAGNOSIS — K21.9: ICD-10-CM

## 2021-09-20 DIAGNOSIS — H40.9: ICD-10-CM

## 2021-09-20 DIAGNOSIS — Z79.899: ICD-10-CM

## 2021-09-20 DIAGNOSIS — Z79.890: ICD-10-CM

## 2021-09-20 DIAGNOSIS — E87.1: ICD-10-CM

## 2021-09-20 LAB
ALBUMIN SERPL-MCNC: 4.6 GM/DL (ref 3.2–4.5)
ALP SERPL-CCNC: 103 U/L (ref 40–136)
ALT SERPL-CCNC: 50 U/L (ref 0–55)
BASOPHILS # BLD AUTO: 0 10^3/UL (ref 0–0.1)
BASOPHILS NFR BLD AUTO: 1 % (ref 0–10)
BILIRUB SERPL-MCNC: 1.4 MG/DL (ref 0.1–1)
BUN/CREAT SERPL: 15
CALCIUM SERPL-MCNC: 8.9 MG/DL (ref 8.5–10.1)
CHLORIDE SERPL-SCNC: 81 MMOL/L (ref 98–107)
CO2 SERPL-SCNC: 19 MMOL/L (ref 21–32)
CREAT SERPL-MCNC: 0.75 MG/DL (ref 0.6–1.3)
EOSINOPHIL # BLD AUTO: 0.2 10^3/UL (ref 0–0.3)
EOSINOPHIL NFR BLD AUTO: 4 % (ref 0–10)
GFR SERPLBLD BASED ON 1.73 SQ M-ARVRAT: 109 ML/MIN
GLUCOSE SERPL-MCNC: 98 MG/DL (ref 70–105)
HCT VFR BLD CALC: 33 % (ref 40–54)
HGB BLD-MCNC: 12.5 G/DL (ref 13.3–17.7)
LYMPHOCYTES # BLD AUTO: 2.3 X 10^3 (ref 1–4)
LYMPHOCYTES NFR BLD AUTO: 44 % (ref 12–44)
MANUAL DIFFERENTIAL PERFORMED BLD QL: NO
MCH RBC QN AUTO: 35 PG (ref 25–34)
MCHC RBC AUTO-ENTMCNC: 37 G/DL (ref 32–36)
MCV RBC AUTO: 94 FL (ref 80–99)
MONOCYTES # BLD AUTO: 0.4 X 10^3 (ref 0–1)
MONOCYTES NFR BLD AUTO: 8 % (ref 0–12)
NEUTROPHILS # BLD AUTO: 2.2 X 10^3 (ref 1.8–7.8)
NEUTROPHILS NFR BLD AUTO: 43 % (ref 42–75)
PLATELET # BLD: 128 10^3/UL (ref 130–400)
PMV BLD AUTO: 10.9 FL (ref 9–12.2)
POTASSIUM SERPL-SCNC: 4.7 MMOL/L (ref 3.6–5)
PROT SERPL-MCNC: 7.4 GM/DL (ref 6.4–8.2)
SODIUM SERPL-SCNC: 116 MMOL/L (ref 135–145)
WBC # BLD AUTO: 5.2 10^3/UL (ref 4.3–11)

## 2021-09-20 PROCEDURE — 36415 COLL VENOUS BLD VENIPUNCTURE: CPT

## 2021-09-20 PROCEDURE — 51702 INSERT TEMP BLADDER CATH: CPT

## 2021-09-20 PROCEDURE — 73502 X-RAY EXAM HIP UNI 2-3 VIEWS: CPT

## 2021-09-20 PROCEDURE — 80053 COMPREHEN METABOLIC PANEL: CPT

## 2021-09-20 PROCEDURE — 85025 COMPLETE CBC W/AUTO DIFF WBC: CPT

## 2021-09-20 PROCEDURE — 96374 THER/PROPH/DIAG INJ IV PUSH: CPT

## 2021-09-20 PROCEDURE — 96375 TX/PRO/DX INJ NEW DRUG ADDON: CPT
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
## 2021-09-20 NOTE — XMS REPORT
Clinical Summary

                             Created on: 2021



Seun Ibarra

External Reference #: [id number]

: 1970

Sex: Male



Demographics





                          Address                   [address] [place] [address]  [address]

 

                          Home Phone                [phone]

 

                          Preferred Language        English

 

                          Marital Status            Single

 

                          Anglican Affiliation     Unknown

 

                          Race                      White

 

                          Ethnic Group              Not  or 





Author





                          Author                    [facility]

 

                          Organization              [facility]

 

                          Address                   Unknown

 

                          Phone                     Unavailable







Support





                Name            Relationship    Address         Phone

 

                Naeem LAMB            Unknown         [phone]







Care Team Providers





                    Care Team Member Name Role                Phone

 

                    Guillaume Lucero MD    Unavailable         [phone]

 

                    Merlin Hernadez DO PCP                 [phone]







Source Comments

Some departments are not documenting in the electronic medical record.  If you d
o not see the information that you expected, contact Release of Information in [facility] Health Information Management department at [phone] for further assistan
ce in locating additional records.[facility]



Allergies

No Known Active Allergies



Medications





                          End Date                  Status



              Medication   Sig          Dispensed    Refills      Start  



                                         Date  

 

                                                    Active



                     furosemide (LASIX) 40 mg  Take 40 mg by       0   



                           tablet                    mouth daily.     

 

                                                    Active



                     spironolactone      Take 100 mg         0   



                           (ALDACTONE) 100 mg tablet  by mouth     



                                         daily.     

 

                                                    Active



                     omeprazole DR(+)    Take 20 mg by       0   



                           (PRILOSEC) 20 mg capsule  mouth twice     



                                         daily.     

 

                                                    Active



                     levothyroxine (SYNTHROID)  Take 50 mcg         0   



                           50 mcg tablet             by mouth     



                                         daily.     







Active Problems





Not on file



Social History





                                        Date



                 Tobacco Use     Types           Packs/Day       Years Used 

 

                                        Quit: 2012



                           Former Smoker             Cigarettes   







                                        Comments



                           Alcohol Use               Standard Drinks/Week 

 

                                         



                           No                        0 (1 standard drink = 0.6 o

z pure alcohol) 







 



                           Sex Assigned at Birth     Date Recorded

 

 



                                         Not on file 







Last Filed Vital Signs





                    Reading             Time Taken          Comments



                                         Vital Sign   

 

                    115/77              2012 10:22 AM CDT  



                                         Blood Pressure   

 

                    57                  2012 10:22 AM CDT  



                                         Pulse   

 

                    36.7 C (98 F)   2012 10:22 AM CDT  



                                         Temperature   

 

                    16                  2012 10:22 AM CDT  



                                         Respiratory Rate   

 

                    -                   -                    



                                         Oxygen Saturation   

 

                    -                   -                    



                                         Inhaled Oxygen   



                                         Concentration   

 

                    75.2 kg (165 lb 12.8 oz) 2012 10:22 AM CDT  



                                         Weight   

 

                    177.8 cm (5' 10")   2012 10:22 AM CDT  



                                         Height   

 

                    23.79               2012 10:22 AM CDT  



                                         Body Mass Index   







Plan of Treatment





   



                 Health Maintenance  Due Date        Last Done       Comments

 

   



                           MEDICARE ANNUAL WELLNESS  1970  



                                         VISIT   

 

   



                           HIV SCREENING             01/15/1983  

 

   



                           DTAP/TDAP VACCINES (1 -   01/15/1986  



                                         Tdap)   

 

   



                           PHYSICAL (COMPREHENSIVE)  01/15/1986  



                                         EXAM   

 

   



                           COLORECTAL CANCER         01/15/2018  



                                         SCREENING   

 

   



                           SHINGLES RECOMBINANT      01/15/2018  



                                         VACCINE (1 of 2)   

 

   



                           INFLUENZA VACCINE         10/01/2021  

 

   



                     HEPATITIS C SCREENING  Completed           2012, 



                                         2012 







Results

Not on filefrom Last 3 Months



Insurance





                                        Type



            Payer      Benefit    Subscriber ID  Effective  Phone      Address 



                           Plan /                    Dates   



                                         Group     

 

                                        Medicare MEDICARE MEDICARE        [id number]     2000-P   



                           PART A AND                resent   



                                         B     

 

                                        Medicare



                 UHC MEDICARE UHC             [id number]       2020-P   



                           COMMUNITY                 resent   



                                         [facility] [place] [facility]     



                                         [place]     

 

                                        Medicaid



                 [facility] MEDICAID [facility]             [id number]     2013-   



                           COMMUNITY                 Present   



                                         [place]AN [place]     







     



            Guarantor Name  Account    Relation to  Date of    Phone      Filomena fu Address



                     Type                Patient             Birth  

 

     



            Seun Ibarra  Personal/F  Self       1970  [phone]  1

[address]               (Home)              [place], [place] [phone]







Advance Directives





                          Patient Representative    Explanation



                           Type                      Date Recorded  

 

                                                     



                           Advance                   2013  7:45 AM  



                                         Directive/DPOA

## 2021-09-20 NOTE — DIAGNOSTIC IMAGING REPORT
INDICATION: 

Fall with pain.



COMPARISON: 

The study is interpreted in correlation with an abdominal/pelvic

CT of 05/17/2021. 



FINDINGS:

There is a displaced fracture of the right femoral neck with

fragmental diastasis along its lateral aspect measuring 1.9 cm

and resulting in moderate varus angulation. A chronic soft tissue

calcification projecting lateral to the intertrochanteric femur

is unchanged from the prior CT with the bone appearing otherwise

normal at that exam. In the left hip, the pubic ring, symphysis,

and SI joints are intact. 



IMPRESSION: 

The displaced right femoral neck fracture results in varus

angulation but no dislocation.



Dictated by: 



  Dictated on workstation # [id number]

## 2022-03-22 NOTE — DIAGNOSTIC IMAGING REPORT
INDICATION: ABDOMEN PAIN



COMPARISON: None



FINDINGS: Supine and upright views of the abdomen show a a few

mildly prominent air-filled loops of small bowel, greatest

involving the proximal small bowel in the left hemiabdomen. A few

air-fluid levels are also noted scattered throughout. There is no

large collection of free intraperitoneal air. No abnormal

extraosseous calcifications are seen. Bony and soft tissue

structures are within normal limits. No organomegaly is

identified.



Accompanying upright chest shows normal heart size and pulmonary

vascularity. The lungs are well aerated and clear. The

mediastinum is normal in appearance. 



IMPRESSION:   

1. Few mildly prominent loops of small bowel with scattered

air-fluid levels. Findings could be on the basis of early or

partial obstruction. Nonspecific enteritis is also consideration.

2. No acute cardiopulmonary process.



Dictated by: 



  Dictated on workstation # [id number]
Yes

## 2022-05-02 ENCOUNTER — HOSPITAL ENCOUNTER (EMERGENCY)
Dept: HOSPITAL 75 - ER FS | Age: 52
Discharge: HOME | End: 2022-05-02
Payer: MEDICARE

## 2022-05-02 VITALS — SYSTOLIC BLOOD PRESSURE: 132 MMHG | DIASTOLIC BLOOD PRESSURE: 84 MMHG

## 2022-05-02 VITALS — WEIGHT: 179.9 LBS | HEIGHT: 67.72 IN | BODY MASS INDEX: 27.58 KG/M2
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
## 2022-05-02 DIAGNOSIS — S20.212A: Primary | ICD-10-CM
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
## 2022-05-02 DIAGNOSIS — K29.20: ICD-10-CM

## 2022-05-02 DIAGNOSIS — K52.9: ICD-10-CM

## 2022-05-02 DIAGNOSIS — R17: ICD-10-CM

## 2022-05-02 DIAGNOSIS — F10.20: ICD-10-CM

## 2022-05-02 LAB
ALBUMIN SERPL-MCNC: 4.8 GM/DL (ref 3.2–4.5)
ALP SERPL-CCNC: 123 U/L (ref 40–136)
ALT SERPL-CCNC: 54 U/L (ref 0–55)
APTT PPP: YELLOW S
BACTERIA #/AREA URNS HPF: NEGATIVE /HPF
BASOPHILS # BLD AUTO: 0 10^3/UL (ref 0–0.1)
BASOPHILS NFR BLD AUTO: 1 % (ref 0–10)
BILIRUB SERPL-MCNC: 1.2 MG/DL (ref 0.1–1)
BILIRUB UR QL STRIP: NEGATIVE
BUN/CREAT SERPL: 10
CALCIUM SERPL-MCNC: 9.8 MG/DL (ref 8.5–10.1)
CHLORIDE SERPL-SCNC: 99 MMOL/L (ref 98–107)
CO2 SERPL-SCNC: 19 MMOL/L (ref 21–32)
CREAT SERPL-MCNC: 0.67 MG/DL (ref 0.6–1.3)
EOSINOPHIL # BLD AUTO: 0 10^3/UL (ref 0–0.3)
EOSINOPHIL NFR BLD AUTO: 1 % (ref 0–10)
FIBRINOGEN PPP-MCNC: CLEAR MG/DL
GFR SERPLBLD BASED ON 1.73 SQ M-ARVRAT: 111 ML/MIN
GLUCOSE SERPL-MCNC: 93 MG/DL (ref 70–105)
GLUCOSE UR STRIP-MCNC: NEGATIVE MG/DL
HCT VFR BLD CALC: 41 % (ref 40–54)
HGB BLD-MCNC: 14.6 G/DL (ref 13.3–17.7)
HYALINE CASTS #/AREA URNS LPF: (no result) /LPF
KETONES UR QL STRIP: NEGATIVE
LEUKOCYTE ESTERASE UR QL STRIP: NEGATIVE
LIPASE SERPL-CCNC: 18 U/L (ref 8–78)
LYMPHOCYTES # BLD AUTO: 1 10^3/UL (ref 1–4)
LYMPHOCYTES NFR BLD AUTO: 24 % (ref 12–44)
MAGNESIUM SERPL-MCNC: 1.7 MG/DL (ref 1.6–2.4)
MANUAL DIFFERENTIAL PERFORMED BLD QL: NO
MCH RBC QN AUTO: 34 PG (ref 25–34)
MCHC RBC AUTO-ENTMCNC: 35 G/DL (ref 32–36)
MCV RBC AUTO: 96 FL (ref 80–99)
MONOCYTES # BLD AUTO: 0.5 10^3/UL (ref 0–1)
MONOCYTES NFR BLD AUTO: 11 % (ref 0–12)
NEUTROPHILS # BLD AUTO: 2.7 10^3/UL (ref 1.8–7.8)
NEUTROPHILS NFR BLD AUTO: 63 % (ref 42–75)
NITRITE UR QL STRIP: NEGATIVE
PH UR STRIP: 6 [PH] (ref 5–9)
PLATELET # BLD: 101 10^3/UL (ref 130–400)
PMV BLD AUTO: 10.7 FL (ref 9–12.2)
POTASSIUM SERPL-SCNC: 4.2 MMOL/L (ref 3.6–5)
PROT SERPL-MCNC: 7.8 GM/DL (ref 6.4–8.2)
PROT UR QL STRIP: NEGATIVE
RBC #/AREA URNS HPF: (no result) /HPF
SODIUM SERPL-SCNC: 134 MMOL/L (ref 135–145)
SP GR UR STRIP: 1.01 (ref 1.02–1.02)
SQUAMOUS #/AREA URNS HPF: (no result) /HPF
WBC # BLD AUTO: 4.3 10^3/UL (ref 4.3–11)
WBC #/AREA URNS HPF: (no result) /HPF

## 2022-05-02 PROCEDURE — 81000 URINALYSIS NONAUTO W/SCOPE: CPT

## 2022-05-02 PROCEDURE — 84484 ASSAY OF TROPONIN QUANT: CPT

## 2022-05-02 PROCEDURE — 85025 COMPLETE CBC W/AUTO DIFF WBC: CPT

## 2022-05-02 PROCEDURE — 83735 ASSAY OF MAGNESIUM: CPT

## 2022-05-02 PROCEDURE — 86141 C-REACTIVE PROTEIN HS: CPT

## 2022-05-02 PROCEDURE — 80053 COMPREHEN METABOLIC PANEL: CPT

## 2022-05-02 PROCEDURE — 80320 DRUG SCREEN QUANTALCOHOLS: CPT

## 2022-05-02 PROCEDURE — 83690 ASSAY OF LIPASE: CPT

## 2022-05-02 PROCEDURE — 83605 ASSAY OF LACTIC ACID: CPT

## 2022-05-02 PROCEDURE — 74022 RADEX COMPL AQT ABD SERIES: CPT

## 2022-05-02 PROCEDURE — 87804 INFLUENZA ASSAY W/OPTIC: CPT

## 2022-05-02 PROCEDURE — 87430 STREP A AG IA: CPT

## 2022-05-02 PROCEDURE — 99284 EMERGENCY DEPT VISIT MOD MDM: CPT

## 2022-05-02 PROCEDURE — 93041 RHYTHM ECG TRACING: CPT

## 2022-05-02 PROCEDURE — 36415 COLL VENOUS BLD VENIPUNCTURE: CPT

## 2022-05-02 PROCEDURE — 93005 ELECTROCARDIOGRAM TRACING: CPT

## 2022-05-02 NOTE — ED GENERAL
General


Chief Complaint:  Substance Abuse


Stated Complaint:  COUGHING BLOOD





History of Present Illness


Date Seen by Provider:  May 2, 2022


Time Seen by Provider:  10:45


Initial Comments


52-year-old male presents with some nausea, vomiting, diarrhea.  Patient reports

he was at work when he had to go the bathroom when he had a bowel movement in 

his pants  Patient reports he was very loose.  Patient reports when he got home 

he started vomiting.  Patient reports he has a some generalized body aches and 

sore throat.  Patient admits to being in a daily drinker that has been up since 

4 AM this morning and had at least 6 if not more at that time and has had a 

drink about every hour.  Patient reports that when it happened he was helping 

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  Patient admits to 

having a history of cirrhosis, frequent marijuana use.  Patient has some mild di

scomfort in left upper quadrant and epigastric region.  Reports that when he 

vomited there was just a little bit of blood associated with it.  Patient denies

any fever.





Allergies and Home Medications


Allergies


Coded Allergies:  


     No Known Drug Allergies (Unverified , 7/24/19)





Patient Home Medication List


Home Medication List Reviewed:  Yes


Bimatoprost (Lumigan) 2.5 Ml Drops, 1 DROP OU DAILY, (Reported)


   Entered as Reported by: LAKIA BALBUENA on 7/24/19 0845


Ciprofloxacin HCl (Ciprofloxacin HCl) 500 Mg Tablet, 500 MG PO BID


   Prescribed by: MIAH KARIMI on 10/21/20 1106


Diclofenac Sodium (Diclofenac Sodium) 75 Mg Tablet.dr, 75 MG PO BID


   Prescribed by: MIAH KARIMI on 10/21/20 1106


Hydrocodone Bit/Acetaminophen (HYDROcodone/APAP 7.5/325 TAB) 1 Each Tablet, 1-2 

TAB PO Q4H


   Prescribed by: STEFAN CLEMENT on 7/31/19 0926


Levothyroxine Sodium (Levothyroxine Sodium) 50 Mcg Tablet, 50 MCG PO DAILY, 

(Reported)


   Entered as Reported by: LAKIA BALBUENA on 7/24/19 0845


Lisinopril/Hydrochlorothiazide (Lisinopril-Hctz 10-12.5 mg Tab) 1 Each Tablet, 2

EACH PO DAILY, (Reported)


   Entered as Reported by: LAKIA BALBUENA on 7/24/19 0845


Metronidazole (Flagyl) 500 Mg Tablet, 500 MG PO TID


   Prescribed by: MIAH KARIMI on 10/21/20 1106


Pantoprazole Sodium (Protonix) 40 Mg Tablet.dr, 40 MG PO DAILY


   Prescribed by: STEFAN CLEMENT on 7/31/19 1149





Review of Systems


Review of Systems


Constitutional:  No diaphoresis, No fever; malaise


EENTM:  no symptoms reported


Respiratory:  No cough, No short of breath


Cardiovascular:  No chest pain, No palpitations


Gastrointestinal:  abdominal pain, diarrhea, nausea, vomiting


Genitourinary:  incontinence


Musculoskeletal:  see HPI


Skin:  other (eccymosis,left chest wall- "someone grabbed boob" )


Psychiatric/Neurological:  No Symptoms Reported


Hematologic/Lymphatic:  See HPI


Immunological/Allergic:  see HPI





Past Medical-Social-Family Hx


Seasonal Allergies


Seasonal Allergies:  No





Past Medical History


Surgeries:  Yes (R knee scope, back sx)


Gallbladder


Respiratory:  No


Cardiac:  Yes


Hypertension


Neurological:  No


Genitourinary:  No


Gastrointestinal:  Yes (epigastric pain)


Gastroesophageal Reflux, Cirrhosis


Musculoskeletal:  No


Endocrine:  Yes


Hypothyroidsim


HEENT:  Yes


Glaucoma


Cancer:  No


Psychosocial:  No


Integumentary:  No


Blood Disorders:  No





Physical Exam


Vital Signs





Vital Signs - First Documented








 5/2/22





 10:45


 


Temp 36.2


 


Pulse 82


 


Resp 20


 


B/P (MAP) 167/95 (119)


 


Pulse Ox 99


 


O2 Delivery Room Air





Capillary Refill :


Height, Weight, BMI


Height: 5'7.00"


Weight: 160lbs. 0.0oz. 72.137871us; 29.00 BMI


Method:


General Appearance:  No Apparent Distress


Eyes:  Bilateral Eye Normal Inspection


HEENT:  PERRL/EOMI, Moist Mucous Membranes


Neck:  Full Range of Motion, Normal Inspection


Respiratory:  Lungs Clear, Normal Breath Sounds


Cardiovascular:  Regular Rate, Rhythm, No Edema


Gastrointestinal:  Soft, Tenderness (mild left upper quad )


Back:  Normal Inspection


Extremity:  Normal Capillary Refill, Normal Range of Motion


Neurologic/Psychiatric:  Alert, Oriented x3


Skin:  Ecchymosis (left chest wall ), Jaundice (mild )





Focused Exam


Lactate Level


5/2/22 10:50: Lactic Acid Level 2.25*H





Lactic Acid Level





Laboratory Tests








Test


 5/2/22


10:50


 


Lactic Acid Level


 2.25 MMOL/L


(0.50-2.00)  *H











Progress/Results/Core Measures


Suspected Sepsis


SIRS


Temperature: 


Pulse:  


Respiratory Rate: 


 


Laboratory Tests


5/2/22 10:50: White Blood Count 4.3


Blood Pressure  / 


Mean: 


 





5/2/22 10:50: Lactic Acid Level 2.25*H


Laboratory Tests


5/2/22 10:50: 


Creatinine 0.67, Platelet Count 101L, Total Bilirubin 1.2H








Results/Orders


Lab Results





Laboratory Tests








Test


 5/2/22


10:50 5/2/22


10:54 5/2/22


10:56 5/2/22


11:12 Range/Units


 


 


White Blood Count


 4.3 


 


 


 


 4.3-11.0


10^3/uL


 


Red Blood Count


 4.28 L


 


 


 


 4.30-5.52


10^6/uL


 


Hemoglobin 14.6     13.3-17.7  g/dL


 


Hematocrit 41     40-54  %


 


Mean Corpuscular Volume 96     80-99  fL


 


Mean Corpuscular Hemoglobin 34     25-34  pg


 


Mean Corpuscular Hemoglobin


Concent 35 


 


 


 


 32-36  g/dL





 


Red Cell Distribution Width 13.2     10.0-14.5  %


 


Platelet Count


 101 L


 


 


 


 130-400


10^3/uL


 


Mean Platelet Volume 10.7     9.0-12.2  fL


 


Immature Granulocyte % (Auto) 0      %


 


Neutrophils (%) (Auto) 63     42-75  %


 


Lymphocytes (%) (Auto) 24     12-44  %


 


Monocytes (%) (Auto) 11     0-12  %


 


Eosinophils (%) (Auto) 1     0-10  %


 


Basophils (%) (Auto) 1     0-10  %


 


Neutrophils # (Auto)


 2.7 


 


 


 


 1.8-7.8


10^3/uL


 


Lymphocytes # (Auto)


 1.0 


 


 


 


 1.0-4.0


10^3/uL


 


Monocytes # (Auto)


 0.5 


 


 


 


 0.0-1.0


10^3/uL


 


Eosinophils # (Auto)


 0.0 


 


 


 


 0.0-0.3


10^3/uL


 


Basophils # (Auto)


 0.0 


 


 


 


 0.0-0.1


10^3/uL


 


Immature Granulocyte # (Auto)


 0.0 


 


 


 


 0.0-0.1


10^3/uL


 


Sodium Level 134 L    135-145  MMOL/L


 


Potassium Level 4.2     3.6-5.0  MMOL/L


 


Chloride Level 99       MMOL/L


 


Carbon Dioxide Level 19 L    21-32  MMOL/L


 


Anion Gap 16 H    5-14  MMOL/L


 


Blood Urea Nitrogen 7     7-18  MG/DL


 


Creatinine


 0.67 


 


 


 


 0.60-1.30


MG/DL


 


Estimat Glomerular Filtration


Rate 111 


 


 


 


  





 


BUN/Creatinine Ratio 10      


 


Glucose Level 93       MG/DL


 


Lactic Acid Level


 2.25 *H


 


 


 


 0.50-2.00


MMOL/L


 


Calcium Level 9.8     8.5-10.1  MG/DL


 


Corrected Calcium      8.5-10.1  MG/DL


 


Magnesium Level 1.7     1.6-2.4  MG/DL


 


Total Bilirubin 1.2 H    0.1-1.0  MG/DL


 


Aspartate Amino Transf


(AST/SGOT) 103 H


 


 


 


 5-34  U/L





 


Alanine Aminotransferase


(ALT/SGPT) 54 


 


 


 


 0-55  U/L





 


Alkaline Phosphatase 123       U/L


 


Troponin I < 0.30     <0.30  NG/ML


 


C-Reactive Protein 0.42     <0.50  MG/DL


 


Total Protein 7.8     6.4-8.2  GM/DL


 


Albumin 4.8 H    3.2-4.5  GM/DL


 


Lipase 18     8-78  U/L


 


Serum Alcohol 233 H    <10  MG/DL


 


Group A Streptococcus Screen  NEGATIVE    NEGATIVE  


 


Urine Color   YELLOW    


 


Urine Clarity   CLEAR    


 


Urine pH   6.0   5-9  


 


Urine Specific Gravity   1.010 L  1.016-1.022  


 


Urine Protein   NEGATIVE   NEGATIVE  


 


Urine Glucose (UA)   NEGATIVE   NEGATIVE  


 


Urine Ketones   NEGATIVE   NEGATIVE  


 


Urine Nitrite   NEGATIVE   NEGATIVE  


 


Urine Bilirubin   NEGATIVE   NEGATIVE  


 


Urine Urobilinogen   0.2   < = 1.0  MG/DL


 


Urine Leukocyte Esterase   NEGATIVE   NEGATIVE  


 


Urine RBC (Auto)   NEGATIVE   NEGATIVE  


 


Urine RBC   NONE    /HPF


 


Urine WBC   0-2    /HPF


 


Urine Squamous Epithelial


Cells 


 


 2-5 


 


  /HPF





 


Urine Crystals   NONE    /LPF


 


Urine Bacteria   NEGATIVE    /HPF


 


Urine Casts   PRESENT    /LPF


 


Urine Hyaline Casts   5-10 H   /LPF


 


Urine Mucus   SMALL H   /LPF


 


Urine Culture Indicated   NO    


 


Influenza Type A Antigen    NEGATIVE  NEGATIVE  


 


Influenza Type B Antigen    NEGATIVE  NEGATIVE  








My Orders





Orders - SHAW,SUMAYA L DO


Alcohol (5/2/22 10:49)


Cbc With Automated Diff (5/2/22 10:49)


Comprehensive Metabolic Panel (5/2/22 10:49)


Lactic Acid Analyzer (5/2/22 10:49)


Lipase (5/2/22 10:49)


Magnesium (5/2/22 10:49)


Rapid Strep A Screen (5/2/22 10:49)


Ua Culture If Indicated (5/2/22 10:49)


Crp Fs (5/2/22 10:49)


Influenza A & B Antigens (5/2/22 10:49)


Ondansetron Injection (Zofran Injectio (5/2/22 11:00)


Lactated Ringers (Lr 1000 Ml Iv Solution (5/2/22 10:49)


Famotidine Injection (Pepcid Injection) (5/2/22 10:49)


Troponin I Fs (5/2/22 10:49)


Ekg Tracing (5/2/22 10:49)


Monitor-Rhythm Ecg Trace Only (5/2/22 10:49)


Acute Abd Series (5/2/22 10:49)





Medications Given in ED





Current Medications








 Medications  Dose


 Ordered  Sig/Jose


 Route  Start Time


 Stop Time Status Last Admin


Dose Admin


 


 Ondansetron HCl  4 mg  ONCE  ONCE


 IVP  5/2/22 11:00


 5/2/22 11:03 DC 5/2/22 10:59


4 MG








Vital Signs/I&O











 5/2/22





 10:45


 


Temp 36.2


 


Pulse 82


 


Resp 20


 


B/P (MAP) 167/95 (119)


 


Pulse Ox 99


 


O2 Delivery Room Air





Capillary Refill :


Progress Note :  


Progress Note


Patient's symptoms are consistent with a GI bug and likely some alcoholic 

gastritis and alcohol intake.  Patient had no acute findings on exam.  Patient 

feeling better following treatment.  He should continue his daily Protonix.  I 

did recommend he consider getting help to stop drinking.  Patient stable discha

rged





ECG


Initial ECG Impression Date:  May 2, 2022


Initial ECG Impression Time:  10:51


Initial ECG Rate:  68


Initial ECG Rhythm:  Normal Sinus


Initial ECG Intervals:  Normal


Initial ECG Impression:  Normal





Diagnostic Imaging





   Diagonstic Imaging:  Xray


   Plain Films/CT/US/NM/MRI:  chest, abdomen


Comments


Date of Exam:05/02/22





ACUTE ABD SERIES








EXAMINATION: Abdominal series and chest radiograph





HISTORY: vomiting, abd pain 





COMPARISON: 10/04/2019





FINDINGS: 





Heart size and pulmonary vasculature are normal. The lungs are


clear without consolidation, pleural effusion, or pneumothorax.


The osseous structures are intact.





There is moderate amount of gas and stool throughout the colon.


Nonobstructive bowel gas pattern. No radiopaque foreign body.


Surgical changes from right hip arthroplasty.





Right upper quadrant cholecystectomy clips are present.





IMPRESSION:





No acute abnormality in the chest or abdomen.





Departure


Impression





   Primary Impression:  


   Alcoholism


   Additional Impressions:  


   Gastroenteritis


   Alcoholic gastritis


   Qualified Codes:  K29.20 - Alcoholic gastritis without bleeding


Disposition:  01 HOME, SELF-CARE


Condition:  Stable





Departure-Patient Inst.


Referrals:  


TIKA JONES DO (PCP/Family)


Primary Care Physician


Patient Instructions:  ALCOHOL AND SUBSTANCE ABUSE, Alcohol Use Disorder ED, 

Diarrhea in Adolescents and Adults, Viral Gastroenteritis





Add. Discharge Instructions:  


Please continue Protonix


Follow-up with your primary care provider for continued treatment as needed


Please consider getting help with your alcohol use





All discharge instructions reviewed with patient and/or family. Voiced 

understanding.











SUMAYA SHAW DO                May 2, 2022 11:00

## 2022-05-02 NOTE — DIAGNOSTIC IMAGING REPORT
EXAMINATION: Abdominal series and chest radiograph



HISTORY: vomiting, abd pain 



COMPARISON: 10/04/2019



FINDINGS: 



Heart size and pulmonary vasculature are normal. The lungs are

clear without consolidation, pleural effusion, or pneumothorax.

The osseous structures are intact.



There is moderate amount of gas and stool throughout the colon.

Nonobstructive bowel gas pattern. No radiopaque foreign body.

Surgical changes from right hip arthroplasty.



Right upper quadrant cholecystectomy clips are present.



IMPRESSION:



No acute abnormality in the chest or abdomen.



Dictated by: 



  Dictated on workstation # [id number]

## 2022-09-08 ENCOUNTER — HOSPITAL ENCOUNTER (EMERGENCY)
Dept: HOSPITAL 75 - ER FS | Age: 52
Discharge: HOME | End: 2022-09-08
Payer: MEDICARE

## 2022-09-08 VITALS — DIASTOLIC BLOOD PRESSURE: 113 MMHG | SYSTOLIC BLOOD PRESSURE: 170 MMHG

## 2022-09-08 VITALS — WEIGHT: 173.94 LBS | HEIGHT: 67.72 IN | BODY MASS INDEX: 26.67 KG/M2
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
## 2022-09-08 DIAGNOSIS — S51.852A: Primary | ICD-10-CM

## 2022-09-08 PROCEDURE — 12001 RPR S/N/AX/GEN/TRNK 2.5CM/<: CPT

## 2022-09-08 NOTE — ED INTEGUMENTARY GENERAL
General


Stated Complaint:  LT ARM DOG BITE





History of Present Illness


Date Seen by Provider:  Sep 8, 2022


Time Seen by Provider:  09:46


Initial Comments


52-year-old male presents with dog bite to the left arm.  Patient was delivering

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  Patient has 

approximately 5 puncture wounds circumferential to the left forearm.  Patient 

has full range of motion.  He is up-to-date on his tetanus.  He suffered no 

other injury.





Allergies and Home Medications


Allergies


Coded Allergies:  


     No Known Drug Allergies (Unverified , 7/24/19)





Patient Home Medication List


Home Medication List Reviewed:  Yes


Amoxicillin/Potassium Clav (Amox Tr-K Clv 875-125 mg Tab) 875 Mg-125 Mg Tablet, 

1 EACH PO BID


   Prescribed by: SUMAYA SHAW on 9/8/22 1014


Bimatoprost (Lumigan) 2.5 Ml Drops, 1 DROP OU DAILY, (Reported)


   Entered as Reported by: LAKIA BALBUENA on 7/24/19 0845


Ciprofloxacin HCl (Ciprofloxacin HCl) 500 Mg Tablet, 500 MG PO BID


   Prescribed by: MIAH KARIMI on 10/21/20 1106


Diclofenac Sodium (Diclofenac Sodium) 75 Mg Tablet.dr, 75 MG PO BID


   Prescribed by: MIAH KARIMI on 10/21/20 1106


Hydrocodone Bit/Acetaminophen (HYDROcodone/APAP 7.5/325 TAB) 1 Each Tablet, 1-2 

TAB PO Q4H


   Prescribed by: STEFAN CLEMENT on 7/31/19 0926


Levothyroxine Sodium (Levothyroxine Sodium) 50 Mcg Tablet, 50 MCG PO DAILY, 

(Reported)


   Entered as Reported by: LAKIA BALBUENA on 7/24/19 0845


Lisinopril/Hydrochlorothiazide (Lisinopril-Hctz 10-12.5 mg Tab) 1 Each Tablet, 2

EACH PO DAILY, (Reported)


   Entered as Reported by: LAKIA BALBUENA on 7/24/19 0845


Metronidazole (Flagyl) 500 Mg Tablet, 500 MG PO TID


   Prescribed by: MIAH KARIMI on 10/21/20 1106


Pantoprazole Sodium (Protonix) 40 Mg Tablet.dr, 40 MG PO DAILY


   Prescribed by: STEFAN CLEMENT on 7/31/19 1149





Review of Systems


Review of Systems


Constitutional:  no symptoms reported


Respiratory:  no symptoms reported


Cardiovascular:  no symptoms reported


Gastrointestinal:  no symptoms reported


Musculoskeletal:  see HPI


Skin:  change in color


Psychiatric/Neurological:  No Symptoms Reported


Endocrine:  No Symptoms Reported





Past Medical-Social-Family Hx


Immunizations Up To Date


First/Initial COVID19 Vaccinat:  2021


Second COVID19 Vaccination Kyle:  2021





Seasonal Allergies


Seasonal Allergies:  No





Past Medical History


Surgery/Hospitalization HX:  


BACK SX


Surgeries:  Yes (R knee scope, back sx)


Gallbladder


Respiratory:  No


Cardiac:  Yes


Hypertension


Neurological:  No


Genitourinary:  No


Gastrointestinal:  Yes (epigastric pain)


Gastroesophageal Reflux, Cirrhosis


Musculoskeletal:  No


Endocrine:  Yes


Hypothyroidsim


HEENT:  Yes


Glaucoma


Cancer:  No


Psychosocial:  No


Integumentary:  No


Blood Disorders:  No





Physical Exam


Vital Signs





Vital Signs - First Documented








 9/8/22





 09:47


 


Temp 35.4


 


Pulse 78


 


Resp 18


 


B/P (MAP) 170/113 (132)


 


Pulse Ox 97


 


O2 Delivery Room Air





Capillary Refill :


General Appearance:  WD/WN, no apparent distress


Neck:  full range of motion


Cardiovascular:  normal peripheral pulses, regular rate, rhythm


Respiratory:  lungs clear, normal breath sounds


Gastrointestinal:  non tender, soft; No distended


Extremities:  swelling (Left forearm)


Neurologic/Psychiatric:  alert, normal mood/affect, oriented x 3


Skin:  tattoos/piercings


Skin Problem Location:  upper extremities (Left forearm)


Skin Problem Character:  other (Approximately 5-6 puncture wounds left forearm)





Procedures/Interventions





   Wound's Depth, Shape:  irregular


   Wound Explored:  no foreign body removed


   Betadine Prep?:  Yes


   Anesthesia:  1% Lidocaine


   Volume Anesthetic (ccs):  3


   Suture:  Ethlion


   Suture Size:  4-0


   Number of Sutures:  5


Patient with 1 irregular laceration on anterior aspect of the forearm that 

required 3 sutures and 2 other puncture wounds on the posterior aspect of the 

forearm that had 1 small suture each for total of 5 sutures.





Progress/Results/Core Measures


Results/Orders


Vital Signs/I&O











 9/8/22





 09:47


 


Temp 35.4


 


Pulse 78


 


Resp 18


 


B/P (MAP) 170/113 (132)


 


Pulse Ox 97


 


O2 Delivery Room Air











Progress


Progress Note :  


Progress Note


Patient physical exam shows he is neurovascular intact with full range of 

motion.  Patient had multiple puncture wounds but 3 of them that needed sutured.

 Patient tolerated procedure well.  Patient was started on Augmentin for empiric

antibiotic.  He should follow-up in about 8 to 10 days for suture removal sooner

if any signs of infection or concerns.  Patient was stable and discharged home





Departure


Impression





   Primary Impression:  


   Dog bite


   Qualified Codes:  W54.0XXA - Bitten by dog, initial encounter


Disposition:  01 HOME, SELF-CARE


Condition:  Stable





Departure-Patient Inst.


Referrals:  


TIKA JONES DO (PCP/Family)


Primary Care Physician


Patient Instructions:  Animal Bites (DC)





Add. Discharge Instructions:  


Keep clean with warm soapy water


Please take prescribed antibiotics until finished


Please return to the ER or see your primary care provider in 8 to 10 days for 

suture removal


Follow-up if wound develops any purulent drainage or significant redness and 

warmth.


Scripts


Amoxicillin/Potassium Clav (Amox Tr-K Clv 875-125 mg Tab) 875 Mg-125 Mg Tablet


1 EACH PO BID, #14 TAB


   Prov: SUMAYA SHAW DO         9/8/22











SUMAYA SHAW DO                Sep 8, 2022 09:48

## 2023-04-01 ENCOUNTER — HOSPITAL ENCOUNTER (EMERGENCY)
Dept: HOSPITAL 75 - ER | Age: 53
LOS: 1 days | Discharge: ANOTHER HOSPITAL | End: 2023-04-02
Payer: MEDICARE

## 2023-04-01 VITALS — HEIGHT: 66.93 IN | WEIGHT: 170.42 LBS | BODY MASS INDEX: 26.75 KG/M2
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
## 2023-04-01 DIAGNOSIS — Z90.49: ICD-10-CM

## 2023-04-01 DIAGNOSIS — E87.1: ICD-10-CM

## 2023-04-01 DIAGNOSIS — R74.8: ICD-10-CM

## 2023-04-01 DIAGNOSIS — F10.229: ICD-10-CM

## 2023-04-01 DIAGNOSIS — F12.90: ICD-10-CM

## 2023-04-01 DIAGNOSIS — I85.11: ICD-10-CM

## 2023-04-01 DIAGNOSIS — D62: ICD-10-CM

## 2023-04-01 DIAGNOSIS — K70.30: Primary | ICD-10-CM

## 2023-04-01 LAB
ALBUMIN SERPL-MCNC: 3.9 GM/DL (ref 3.2–4.5)
ALP SERPL-CCNC: 133 U/L (ref 40–136)
ALT SERPL-CCNC: 61 U/L (ref 0–55)
AMYLASE SERPL-CCNC: 29 U/L (ref 25–125)
APTT BLD: 42 SEC (ref 24–35)
APTT PPP: YELLOW S
BACTERIA #/AREA URNS HPF: (no result) /HPF
BARBITURATES UR QL: NEGATIVE
BASOPHILS # BLD AUTO: 0 10^3/UL (ref 0–0.1)
BASOPHILS NFR BLD AUTO: 0 % (ref 0–10)
BENZODIAZ UR QL SCN: NEGATIVE
BILIRUB SERPL-MCNC: 2.7 MG/DL (ref 0.1–1)
BILIRUB UR QL STRIP: NEGATIVE
BUN/CREAT SERPL: 16
CALCIUM SERPL-MCNC: 8.7 MG/DL (ref 8.5–10.1)
CHLORIDE SERPL-SCNC: 86 MMOL/L (ref 98–107)
CO2 SERPL-SCNC: 17 MMOL/L (ref 21–32)
COCAINE UR QL: NEGATIVE
CREAT SERPL-MCNC: 0.68 MG/DL (ref 0.6–1.3)
EOSINOPHIL # BLD AUTO: 0 10^3/UL (ref 0–0.3)
EOSINOPHIL NFR BLD AUTO: 0 % (ref 0–10)
ERYTHROCYTE [SEDIMENTATION RATE] IN BLOOD: 31 MM/HR (ref 0–30)
FIBRINOGEN PPP-MCNC: CLEAR MG/DL
GFR SERPLBLD BASED ON 1.73 SQ M-ARVRAT: 110 ML/MIN
GLUCOSE SERPL-MCNC: 99 MG/DL (ref 70–105)
GLUCOSE UR STRIP-MCNC: NEGATIVE MG/DL
HCT VFR BLD CALC: 29 % (ref 40–54)
HGB BLD-MCNC: 10.8 G/DL (ref 13.3–17.7)
INR PPP: 1.2 (ref 0.8–1.4)
KETONES UR QL STRIP: (no result)
LEUKOCYTE ESTERASE UR QL STRIP: NEGATIVE
LIPASE SERPL-CCNC: 19 U/L (ref 8–78)
LYMPHOCYTES # BLD AUTO: 0.8 10^3/UL (ref 1–4)
LYMPHOCYTES NFR BLD AUTO: 9 % (ref 12–44)
MAGNESIUM SERPL-MCNC: 1.7 MG/DL (ref 1.6–2.4)
MANUAL DIFFERENTIAL PERFORMED BLD QL: NO
MCH RBC QN AUTO: 36 PG (ref 25–34)
MCHC RBC AUTO-ENTMCNC: 37 G/DL (ref 32–36)
MCV RBC AUTO: 96 FL (ref 80–99)
METHADONE UR QL SCN: NEGATIVE
MONOCYTES # BLD AUTO: 0.6 10^3/UL (ref 0–1)
MONOCYTES NFR BLD AUTO: 7 % (ref 0–12)
NEUTROPHILS # BLD AUTO: 7.3 10^3/UL (ref 1.8–7.8)
NEUTROPHILS NFR BLD AUTO: 84 % (ref 42–75)
NITRITE UR QL STRIP: NEGATIVE
OPIATES UR QL SCN: NEGATIVE
OXYCODONE UR QL: NEGATIVE
PH UR STRIP: 6 [PH] (ref 5–9)
PLATELET # BLD: 85 10^3/UL (ref 130–400)
PMV BLD AUTO: 12.2 FL (ref 9–12.2)
POTASSIUM SERPL-SCNC: 4.1 MMOL/L (ref 3.6–5)
PROPOXYPH UR QL: NEGATIVE
PROT SERPL-MCNC: 7.1 GM/DL (ref 6.4–8.2)
PROT UR QL STRIP: NEGATIVE
PROTHROMBIN TIME: 15.9 SEC (ref 12.2–14.7)
RBC #/AREA URNS HPF: (no result) /HPF
SODIUM SERPL-SCNC: 117 MMOL/L (ref 135–145)
SP GR UR STRIP: <=1.005 (ref 1.02–1.02)
SQUAMOUS #/AREA URNS HPF: (no result) /HPF
TRICYCLICS UR QL SCN: NEGATIVE
WBC # BLD AUTO: 8.8 10^3/UL (ref 4.3–11)
WBC #/AREA URNS HPF: (no result) /HPF

## 2023-04-01 PROCEDURE — 80329 ANALGESICS NON-OPIOID 1 OR 2: CPT

## 2023-04-01 PROCEDURE — 80306 DRUG TEST PRSMV INSTRMNT: CPT

## 2023-04-01 PROCEDURE — 85025 COMPLETE CBC W/AUTO DIFF WBC: CPT

## 2023-04-01 PROCEDURE — 85027 COMPLETE CBC AUTOMATED: CPT

## 2023-04-01 PROCEDURE — 80048 BASIC METABOLIC PNL TOTAL CA: CPT

## 2023-04-01 PROCEDURE — 36415 COLL VENOUS BLD VENIPUNCTURE: CPT

## 2023-04-01 PROCEDURE — 80320 DRUG SCREEN QUANTALCOHOLS: CPT

## 2023-04-01 PROCEDURE — 85652 RBC SED RATE AUTOMATED: CPT

## 2023-04-01 PROCEDURE — 83735 ASSAY OF MAGNESIUM: CPT

## 2023-04-01 PROCEDURE — 83690 ASSAY OF LIPASE: CPT

## 2023-04-01 PROCEDURE — 85610 PROTHROMBIN TIME: CPT

## 2023-04-01 PROCEDURE — 80053 COMPREHEN METABOLIC PANEL: CPT

## 2023-04-01 PROCEDURE — 71260 CT THORAX DX C+: CPT

## 2023-04-01 PROCEDURE — 86141 C-REACTIVE PROTEIN HS: CPT

## 2023-04-01 PROCEDURE — 74177 CT ABD & PELVIS W/CONTRAST: CPT

## 2023-04-01 PROCEDURE — 93041 RHYTHM ECG TRACING: CPT

## 2023-04-01 PROCEDURE — 85730 THROMBOPLASTIN TIME PARTIAL: CPT

## 2023-04-01 PROCEDURE — 82150 ASSAY OF AMYLASE: CPT

## 2023-04-01 PROCEDURE — 81000 URINALYSIS NONAUTO W/SCOPE: CPT

## 2023-04-01 NOTE — ED GI
General


Chief Complaint:  Abdominal/GI Problems


Stated Complaint:  VOMITING - BLOOD IN STOOL


Nursing Triage Note:  


PATENT COMPLAINT OF ABDOMINAL PAIN X3 DAYS. PATIENT STATES HE IS VOMITTING AND 


HAS DIARRHEA BOTH DARK RED


Source of Information:  Patient, Other (BROTHER )


Exam Limitations:  Other (PT IS LIMITED HISTORIAN)





History of Present Illness


Date Seen by Provider:  Apr 1, 2023


Time Seen by Provider:  21:00


Initial Comments


PT ARRIVES VIA POV FROM HOME WITH BROTHER


PT STATES THAT FOR THE LAST 3 DAYS, HE HAS BEEN VOMITING UP BLOOD AND HAVING 

BLACK STOOLS


HAS HAD VOMITED AT LEAST 4-5 TIMES TODAY, AND HAD 3-4 BM'S TODAY


C/O DIFFUSE ABDOMINAL PAIN 


NO FEVER





PT HAS HISTORY OF CIRRHOSIS, CONTINUES TO DRINK AT LEAST A  30 PACK OF BEER/DAY.




HE HAS HISTORY OF SEVERE GASTRITIS,  AS WELL AS ESOPHAGEAL VARICES--NOTED ON EGD

DONE 07/31/2019. . 


HE DENIES HISTORY OF SEIZURES, BUT DOES GET THE SHAKES AND SWEATS IF HE GOES TOO

LONG WITHOUT ALCOHOL. PT STATES THE ONLY TIME HE TRIED TO STOP DRINKING WAS "TOO

MANY YEARS AGO TO REMEMBER" . 


HE ALSO USES MARIJUANA DAILY





HE HAS NOT SOUGHT CARE UNTIL TODAY


SYMPTOMS NO DIFFERENT TODAY, BROTHER MADE HIM COME HERE TODAY. 


HE CLAIMS NO ALCOHOL OR MARIJUANA TODAY--BROTHER REPORTS THAT PT HAS HAD "2 

BEERS" TODAY





PT HAS HISTORY OF HTN


HE HAS HAD RIGHT HIP REPLACEMENT





PCP: DR. JONES IN [place].





Allergies and Home Medications


Allergies


Coded Allergies:  


     No Known Drug Allergies (Unverified , 7/24/19)





Patient Home Medication List


Home Medication List Reviewed:  Yes


Amoxicillin/Potassium Clav (Amox Tr-K Clv 875-125 mg Tab) 875 Mg-125 Mg Tablet, 

1 EACH PO BID


   Prescribed by: SUMAYA SHAW on 9/8/22 1014


Bimatoprost (Lumigan) 2.5 Ml Drops, 1 DROP OU DAILY, (Reported)


   Entered as Reported by: LAKIA BALBUENA on 7/24/19 0845


Ciprofloxacin HCl (Ciprofloxacin HCl) 500 Mg Tablet, 500 MG PO BID


   Prescribed by: MIAH KARIMI on 10/21/20 1106


Diclofenac Sodium (Diclofenac Sodium) 75 Mg Tablet.dr, 75 MG PO BID


   Prescribed by: MIAH KARIMI on 10/21/20 1106


Hydrocodone Bit/Acetaminophen (HYDROcodone/APAP 7.5/325 TAB) 1 Each Tablet, 1-2 

TAB PO Q4H


   Prescribed by: STEFAN CLEMENT on 7/31/19 0926


Levothyroxine Sodium (Levothyroxine Sodium) 50 Mcg Tablet, 50 MCG PO DAILY, 

(Reported)


   Entered as Reported by: LAKIA BALBUENA on 7/24/19 0845


Lisinopril/Hydrochlorothiazide (Lisinopril-Hctz 10-12.5 mg Tab) 1 Each Tablet, 2

EACH PO DAILY, (Reported)


   Entered as Reported by: LAKIA BALBUENA on 7/24/19 0845


Metronidazole (Flagyl) 500 Mg Tablet, 500 MG PO TID


   Prescribed by: MIAH KARIMI on 10/21/20 1106


Pantoprazole Sodium (Protonix) 40 Mg Tablet.dr, 40 MG PO DAILY


   Prescribed by: STEFAN CLEMENT on 7/31/19 1149





Review of Systems


Review of Systems


Constitutional:  see HPI; No dizziness; malaise, weakness


EENTM:  No Symptoms Reported


Respiratory:  No Symptoms Reported


Cardiovascular:  No Symptoms Reported


Gastrointestinal:  See HPI, Nausea, Vomiting, Other (VOMITING BRIGHT RED BLOOD 

AND STOOLS HAVE BEEN BLACK AND TARRY )


Genitourinary:  No Symptoms Reported


Musculoskeletal:  no symptoms reported


Skin:  no symptoms reported


Psychiatric/Neurological:  No Symptoms Reported


Endocrine:  No Symptoms Reported


Hematologic/Lymphatic:  No Symptoms Reported





Past Medical-Social-Family Hx


Patient Social History


Tobacco Use?:  No


Substance use?:  Yes


Substance type:  Marijuana


Additional substance use comme:  LAST TIME SMOKED MARIJUANA  03/28/2023


Substance frequency:  Daily


Alcohol Use?:  Yes


Alcohol type:  Beer, Hard Liquor


Alcohol Frequency:  Daily





Immunizations Up To Date


First/Initial COVID19 Vaccinat:  2021


Second COVID19 Vaccination Kyle:  2021


Third COVID19 Vaccination Date:  2021





Seasonal Allergies


Seasonal Allergies:  No





Past Medical History


Surgery/Hospitalization HX:  


BACK SX, CIRRHOSIS


Surgeries:  Yes (R knee scope, back sx)


Gallbladder, Orthopedic


Respiratory:  No


Cardiac:  Yes


Hypertension


Neurological:  No


Genitourinary:  No


Gastrointestinal:  Yes (GASTRITIS)


Gastroesophageal Reflux, Hemorrhoids, Esophageal Varices, Cirrhosis


Musculoskeletal:  Yes (BACK SURGERY; R HIP FX; R KNEE SCOPE)


Chronic Back Pain, Fractures


Endocrine:  Yes


Hypothyroidsim


HEENT:  Yes


Glaucoma


Cancer:  No


Psychosocial:  Yes (ALCOHOLISM AND MARIJUANA USE)


Integumentary:  No


Blood Disorders:  No





Family Medical History








SOCIAL HISTORY:


-SMOKING--DENIES USE


-ETOH-- 30 PACK/DAY ALL OF LIFE


-MARIJUANA--DAILY USE





PAST SURGICAL HISTORY:


-07/31/2019--CHOLECYSTECTOMY, WITH EGD AND COLONOSCOPY BY DR. SLOAN


( MODERATE TO SEVERE GASTRITIS, GRADE 3 ESOPHAGEAL VARICES, HEMORRHOIDS)





-RIGHT KNEE SCOPE





-BACK SURGERY





-09/20/2021--RIGHT HIP FRACTURE





Physical Exam


Vital Signs





Vital Signs - First Documented








 4/1/23 4/2/23





 20:46 02:00


 


Temp  36.6


 


Pulse 94 


 


Resp 20 


 


B/P (MAP) 160/91 (114) 


 


Pulse Ox 99 


 


O2 Delivery Room Air 





Capillary Refill : Less Than 3 Seconds


Height/Weight/BMI


Height: 5'7.00"


Weight: 160lbs. 0.0oz. [id number]; 26.00 BMI


Method:


General Appearance:  WD/WN, other (ACTIVELY VOMITING BRIGHT RED BLOOD ON 

ARRIVAL--NO EMESIS, ONLY PURE BLOOD, NO CLOTS.  STRONG ODOR OF ETOH. )


HEENT:  PERRL/EOMI, scleral icterus (R), scleral icterus (L)


Neck:  normal inspection


Respiratory:  normal breath sounds, no respiratory distress, no accessory muscle

use


Cardiovascular:  regular rate, rhythm, no murmur


Gastrointestinal:  No guarding, No rebound; tenderness (DIFFUSE UPPER ABDOMINAL 

TENDERNESS. ); No hernia, No mass; other (ABDOMEN SOMEWHAT FIRM)


Extremities:  normal inspection, normal capillary refill


Back:  no CVA tenderness


Neurologic/Psychiatric:  CNs II-XII nml as tested, no motor/sensory deficits, 

alert, oriented x 3 (SOMEWHAT SLOW MENTATION. ), other (TREMORS. )


Skin:  warm/dry, other (SALLOW. )





Procedures/Interventions





   Suture Size:  4-0





Progress/Results/Core Measures


Results/Orders


Lab Results





Laboratory Tests








Test


 4/1/23


21:25 4/1/23


22:14 4/2/23


01:05 Range/Units


 


 


White Blood Count


 8.8 


 


 5.5 


 4.3-11.0


10^3/uL


 


Red Blood Count


 3.02 L


 


 2.21 L


 4.30-5.52


10^6/uL


 


Hemoglobin 10.8 L  7.8 #L 13.3-17.7  g/dL


 


Hematocrit 29 L  21 L 40-54  %


 


Mean Corpuscular Volume 96   97  80-99  fL


 


Mean Corpuscular Hemoglobin 36 H  35 H 25-34  pg


 


Mean Corpuscular Hemoglobin


Concent 37 H


 


 36 


 32-36  g/dL





 


Red Cell Distribution Width 12.6   12.9  10.0-14.5  %


 


Platelet Count


 85 L


 


 49 L


 130-400


10^3/uL


 


Mean Platelet Volume 12.2   12.6 H 9.0-12.2  fL


 


Immature Granulocyte % (Auto) 0     %


 


Neutrophils (%) (Auto) 84 H   42-75  %


 


Lymphocytes (%) (Auto) 9 L   12-44  %


 


Monocytes (%) (Auto) 7    0-12  %


 


Eosinophils (%) (Auto) 0    0-10  %


 


Basophils (%) (Auto) 0    0-10  %


 


Neutrophils # (Auto)


 7.3 


 


 


 1.8-7.8


10^3/uL


 


Lymphocytes # (Auto)


 0.8 L


 


 


 1.0-4.0


10^3/uL


 


Monocytes # (Auto)


 0.6 


 


 


 0.0-1.0


10^3/uL


 


Eosinophils # (Auto)


 0.0 


 


 


 0.0-0.3


10^3/uL


 


Basophils # (Auto)


 0.0 


 


 


 0.0-0.1


10^3/uL


 


Immature Granulocyte # (Auto)


 0.0 


 


 


 0.0-0.1


10^3/uL


 


Percent Immature Platelet


Fraction 16.9 H


 


 15.4 H


 0.0-7.6  %





 


Erythrocyte Sedimentation Rate 31 H   0-30  MM/HR


 


Prothrombin Time 15.9 H   12.2-14.7  SEC


 


INR Comment 1.2    0.8-1.4  


 


Activated Partial


Thromboplast Time 42 H


 


 


 24-35  SEC





 


Sodium Level 117 *L  124 *L 135-145  MMOL/L


 


Potassium Level 4.1   4.1  3.6-5.0  MMOL/L


 


Chloride Level 86 L  96 L   MMOL/L


 


Carbon Dioxide Level 17 L  16 L 21-32  MMOL/L


 


Anion Gap 14   12  5-14  MMOL/L


 


Blood Urea Nitrogen 11   9  7-18  MG/DL


 


Creatinine


 0.68 


 


 0.63 


 0.60-1.30


MG/DL


 


Estimat Glomerular Filtration


Rate 110 


 


 112 


  





 


BUN/Creatinine Ratio 16   14   


 


Glucose Level 99   89    MG/DL


 


Calcium Level 8.7   7.3 L 8.5-10.1  MG/DL


 


Corrected Calcium 8.8    8.5-10.1  MG/DL


 


Magnesium Level 1.7    1.6-2.4  MG/DL


 


Total Bilirubin 2.7 H   0.1-1.0  MG/DL


 


Aspartate Amino Transf


(AST/SGOT) 139 H


 


 


 5-34  U/L





 


Alanine Aminotransferase


(ALT/SGPT) 61 H


 


 


 0-55  U/L





 


Alkaline Phosphatase 133      U/L


 


C-Reactive Protein High


Sensitivity 1.52 H


 


 


 0.00-0.50


MG/DL


 


Total Protein 7.1    6.4-8.2  GM/DL


 


Albumin 3.9    3.2-4.5  GM/DL


 


Amylase Level 29      U/L


 


Lipase 19    8-78  U/L


 


Acetaminophen Level < 10 L   10-30  UG/ML


 


Serum Alcohol 169 H   <10  MG/DL


 


Urine Color  YELLOW    


 


Urine Clarity  CLEAR    


 


Urine pH  6.0   5-9  


 


Urine Specific Gravity  <=1.005   1.016-1.022  


 


Urine Protein  NEGATIVE   NEGATIVE  


 


Urine Glucose (UA)  NEGATIVE   NEGATIVE  


 


Urine Ketones  TRACE H  NEGATIVE  


 


Urine Nitrite  NEGATIVE   NEGATIVE  


 


Urine Bilirubin  NEGATIVE   NEGATIVE  


 


Urine Urobilinogen  0.2   < = 1.0  MG/DL


 


Urine Leukocyte Esterase  NEGATIVE   NEGATIVE  


 


Urine RBC (Auto)  NEGATIVE   NEGATIVE  


 


Urine RBC  NONE    /HPF


 


Urine WBC  NONE    /HPF


 


Urine Squamous Epithelial


Cells 


 RARE 


 


  /HPF





 


Urine Crystals  NONE    /LPF


 


Urine Bacteria  TRACE    /HPF


 


Urine Casts  NONE    /LPF


 


Urine Mucus  NEGATIVE    /LPF


 


Urine Culture Indicated  NO    


 


Urine Opiates Screen  NEGATIVE   NEGATIVE  


 


Urine Oxycodone Screen  NEGATIVE   NEGATIVE  


 


Urine Methadone Screen  NEGATIVE   NEGATIVE  


 


Urine Propoxyphene Screen  NEGATIVE   NEGATIVE  


 


Urine Barbiturates Screen  NEGATIVE   NEGATIVE  


 


Ur Tricyclic Antidepressants


Screen 


 NEGATIVE 


 


 NEGATIVE  





 


Urine Phencyclidine Screen  NEGATIVE   NEGATIVE  


 


Urine Amphetamines Screen  NEGATIVE   NEGATIVE  


 


Urine Methamphetamines Screen  NEGATIVE   NEGATIVE  


 


Urine Benzodiazepines Screen  NEGATIVE   NEGATIVE  


 


Urine Cocaine Screen  NEGATIVE   NEGATIVE  


 


Urine Cannabinoids Screen  POSITIVE H  NEGATIVE  








My Orders





Orders - OTONIEL IBRAHIM DO


Ed Iv/Invasive Line Start (4/1/23 20:58)


Monitor-Rhythm Ecg Trace Only (4/1/23 20:58)


Acetaminophen (4/1/23 20:58)


Alcohol (4/1/23 20:58)


Amylase (4/1/23 20:58)


Cbc With Automated Diff (4/1/23 20:58)


Comprehensive Metabolic Panel (4/1/23 20:58)


Hs C Reactive Protein (4/1/23 20:58)


Drug Screen Stat (Urine) (4/1/23 20:58)


Lipase (4/1/23 20:58)


Magnesium (4/1/23 20:58)


Protime With Inr (4/1/23 20:58)


Partial Thromboplastin Time (4/1/23 20:58)


Ua Culture If Indicated (4/1/23 20:58)


Erythrocyte Sedimentation Rate (4/1/23 20:58)


Ed Iv/Invasive Line Start (4/1/23 20:58)


Ns Iv 1000 Ml (Sodium Chloride 0.9%) (4/1/23 21:00)


Ondansetron Injection (Zofran Injectio (4/1/23 21:00)


Pantoprazole Injection (Protonix Injecti (4/1/23 21:00)


Pantoprazole Injection (Protonix Injecti (4/1/23 22:30)


Ondansetron Injection (Zofran Injectio (4/1/23 22:30)


Ct Chest/Abdomen/Pelvis W (4/1/23 22:28)


Ed Iv/Invasive Line Start (4/1/23 22:29)


Ns Iv 1000 Ml (Sodium Chloride 0.9%) (4/1/23 22:30)


Promethazine Injection (Phenergan Injec (4/1/23 23:00)


Iohexol Injection (Omnipaque 350 Mg/Ml 1 (4/1/23 23:45)


Received Contrast (Hold Metformin- Contr (4/1/23 23:45)


Ns (Ivpb) (Sodium Chloride 0.9% Ivpb Bag (4/1/23 23:45)


Ed Iv/Invasive Line Start (4/2/23 00:09)


Ns Iv 1000 Ml (Sodium Chloride 0.9%) (4/2/23 00:15)


Cbc No Diff (4/2/23 00:47)


Basic Metabolic Panel (4/2/23 00:47)


Octreotide  Injection (Sandostatin  Inje (4/2/23 01:00)


Ns (Ivpb) (Sodium C... W/Octreotide  Inj (4/2/23 01:00)


Ns (Ivpb) (Sodium C... W/Pantoprazole In (4/2/23 01:00)


Lorazepam Injection (Ativan Injection) (4/2/23 01:15)





Medications Given in ED





Current Medications








 Medications  Dose


 Ordered  Sig/Jose


 Route  Start Time


 Stop Time Status Last Admin


Dose Admin


 


 Iohexol  100 ml  ONCE  ONCE


 IV  4/1/23 23:45


 4/1/23 23:55 DC 4/1/23 23:47


80 ML


 


 Lorazepam  2 mg  ONCE  ONCE


 IVP  4/2/23 01:15


 4/2/23 01:16 DC 4/2/23 01:20


2 MG


 


 Octreotide


 Acetate 50 mcg/


 Sodium Chloride  51 ml @ 


 204 mls/hr  ONCE  ONCE


 IV  4/2/23 01:00


 4/2/23 01:14 DC 4/2/23 02:08


204 MLS/HR


 


 Ondansetron HCl  4 mg  ONCE  ONCE


 IVP  4/1/23 21:00


 4/1/23 21:01 DC 4/1/23 21:27


4 MG


 


 Ondansetron HCl  8 mg  ONCE  ONCE


 IVP  4/1/23 22:30


 4/1/23 22:31 DC 4/1/23 22:40


8 MG


 


 Pantoprazole  40 mg  ONCE  ONCE


 IV  4/1/23 21:00


 4/1/23 21:01 DC 4/1/23 21:27


40 MG


 


 Pantoprazole  40 mg  ONCE  ONCE


 IV  4/1/23 22:30


 4/1/23 22:31 DC 4/1/23 22:40


40 MG


 


 Promethazine HCl  25 mg  ONCE  ONCE


 IVP  4/1/23 23:00


 4/1/23 23:01 DC 4/1/23 23:12


25 MG


 


 Sodium Chloride  100 ml  ONCE  ONCE


 IV  4/1/23 23:45


 4/1/23 23:55 DC 4/1/23 23:47


80 ML








Vital Signs/I&O











 4/1/23 4/2/23





 20:46 02:00


 


Temp  36.6


 


Pulse 94 90


 


Resp 20 14


 


B/P (MAP) 160/91 (114) 98/58


 


Pulse Ox 99 98


 


O2 Delivery Room Air Room Air














 4/2/23





 00:00


 


Intake Total 2000 ml


 


Balance 2000 ml














Blood Pressure Mean:                    114











Progress


Progress Note :  


Progress Note





PLACED IN ISOLATION ROOM


PPE WORN





GIVEN:


-IV FLUIDS


-ZOFRAN


-PROTONIX BOLUS GIVEN AND DRIP ORDERED


-PHENERGAN


-OCTREOTIDE BOLUS AND DRIP ORDERED


-ATIVAN FOR TREMORS. 





2225--PT CONTINUES TO VOMIT PURE BLOOD, ADDITIONAL MEDICATIONS ORDERED. 





REPEAT LAB DONE--NA IS UP , HGB DOWN TO 7.8


VITALS ARE STABLE, WITH HR IN 80'S, 'S/80'S, O2 SAT 99% ON ROOM AIR. 


AIR TRANSPORT IS ENROUTE, AS PT IS NO LONGER ACTIVELY VOMITING, AND VITALS ARE 

STABLE, WILL DEFER BLOOD TRANSFUSION TO RECEIVING HOSPITAL. 





REVIEWED PRIOR RECORDS--FEW ER VISITS, AND ADMIT FOR 

CHOLECYSTECTOMY/EGD/COLONOSCOPY. 





DISCUSSED TEST RESULTS WITH PT AND NEED FOR TRANSFER TO HIGHER LEVEL OF CARE 

WITH GI SPECIALTY SERVICES, AND HE AGREES TO PLAN.





Diagnostic Imaging





Comments


CT ABDOMEN/PELVIS--PER [facility] VIA FAX AT 0039


--PROMINENT YARI-ESOPHAGEAL VARICES AND UPPER ABDOMINAL VARICES  COMPATIBLE WITH

PORTAL HYPERTENSION


--HEPATIC CIRRHOSIS


   Reviewed:  Reviewed by Me





Departure


Communication (Admissions)


0040--CALLED JOSEPH, LEFT MESSAGE ON MACHINE


[facility]


0056--SPOKE WITH DR. MCGUIRE, INTENSIVIST, ACCEPTS PT FOR TRANSFER, ADVISES 

PROTONIX DRIP AND OCTREOTIDE BOLUS AND DRIP. 





NO LOCAL GROUND EMS TRANSPORT SERVICES AVAILABLE. 


ER STAFF CONTACTING [facility].





0208--AIR TRANSPORT HERE.





Impression





   Primary Impression:  


   Upper gastrointestinal bleed


   Additional Impressions:  


   SEVERE HYPONATREMIA


   Alcohol intoxication in active alcoholic


   Hx of cirrhosis


   Marijuana use


   Elevated liver enzymes


   Esophageal varices in alcoholic cirrhosis


   Acute blood loss anemia


Disposition:  02 XFER SHT-TRM HOSP


Condition:  Stable





Transfer


Transfer Reason:  Exceeds level of care (NEED FOR GI SPECIALTY SERVICES 

UNAVAILABLE HERE)


Transfer Facility:  


[facility]


[facility] RODRIG[facility]


Method of Transfer:  Air





Departure-Patient Inst.


Referrals:  


TIKA JONES DO (PCP/Family)


Primary Care Physician











OTONIEL IBRAHIM DO                  Apr 1, 2023 21:18

## 2023-04-02 VITALS — DIASTOLIC BLOOD PRESSURE: 58 MMHG | SYSTOLIC BLOOD PRESSURE: 98 MMHG

## 2023-04-02 LAB
BUN/CREAT SERPL: 14
CALCIUM SERPL-MCNC: 7.3 MG/DL (ref 8.5–10.1)
CHLORIDE SERPL-SCNC: 96 MMOL/L (ref 98–107)
CO2 SERPL-SCNC: 16 MMOL/L (ref 21–32)
CREAT SERPL-MCNC: 0.63 MG/DL (ref 0.6–1.3)
GFR SERPLBLD BASED ON 1.73 SQ M-ARVRAT: 112 ML/MIN
GLUCOSE SERPL-MCNC: 89 MG/DL (ref 70–105)
HCT VFR BLD CALC: 21 % (ref 40–54)
HGB BLD-MCNC: 7.8 G/DL (ref 13.3–17.7)
MCH RBC QN AUTO: 35 PG (ref 25–34)
MCHC RBC AUTO-ENTMCNC: 36 G/DL (ref 32–36)
MCV RBC AUTO: 97 FL (ref 80–99)
PLATELET # BLD: 49 10^3/UL (ref 130–400)
PMV BLD AUTO: 12.6 FL (ref 9–12.2)
POTASSIUM SERPL-SCNC: 4.1 MMOL/L (ref 3.6–5)
SODIUM SERPL-SCNC: 124 MMOL/L (ref 135–145)
WBC # BLD AUTO: 5.5 10^3/UL (ref 4.3–11)

## 2023-04-02 NOTE — DIAGNOSTIC IMAGING REPORT
PROCEDURE: 

CT chest, abdomen, and pelvis with contrast.



TECHNIQUE: 

Multiple contiguous axial images were obtained through the chest,

abdomen, and pelvis after the administration of intravenous

contrast. Auto Exposure Controls were utilized during the CT exam

to meet ALARA standards for radiation dose reduction. 



INDICATION:

Followup hematemesis, severe gastritis, abdominal pain, history

of varices.



COMPARISON: 

CT abdomen and pelvis 05/17/2021.



DISCUSSION: 



CHEST: 

The lungs are well-aerated. No consolidation or suspicious

pulmonary lesion. Thoracic aorta is normal in caliber and

configuration. Normal heart size. No pleural or pericardial

fluid. No adenopathy. Mild bilateral gynecomastia is noted.

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 No

osseous abnormality identified.



ABDOMEN/PELVIS: 

Changes of cirrhosis are again noted. No discrete liver mass. The

gallbladder is surgically absent. The pancreas, stomach, spleen,

and adrenal glands are unremarkable. Left renal cyst noted. No

renal stone or hydronephrosis. The aorta is normal in caliber.

Large and small bowel loops appear within normal limits. The

bladder and prostate are unremarkable. Right hip prosthesis is

noted. No acute osseous abnormality identified. No ascites or

adenopathy.



IMPRESSION:

1. Stable changes of cirrhosis with worsening paraesophageal

varices.

2. Agree with preliminary report. 



Dictated by: 



  Dictated on workstation # [id number]

## 2023-06-15 ENCOUNTER — HOSPITAL ENCOUNTER (EMERGENCY)
Dept: HOSPITAL 75 - ER FS | Age: 53
Discharge: HOME | End: 2023-06-15
Payer: MEDICARE

## 2023-06-15 VITALS
HEIGHT: 66.02 IN | BODY MASS INDEX: 26.5 KG/M2 | WEIGHT: 164.91 LBS | DIASTOLIC BLOOD PRESSURE: 82 MMHG | SYSTOLIC BLOOD PRESSURE: 143 MMHG

## 2023-06-15 DIAGNOSIS — Z28.311: ICD-10-CM

## 2023-06-15 DIAGNOSIS — R07.89: Primary | ICD-10-CM

## 2023-06-15 LAB
ALBUMIN SERPL-MCNC: 3.9 GM/DL (ref 3.2–4.5)
ALP SERPL-CCNC: 91 U/L (ref 40–136)
ALT SERPL-CCNC: 14 U/L (ref 0–55)
BASOPHILS # BLD AUTO: 0 10^3/UL (ref 0–0.1)
BASOPHILS NFR BLD AUTO: 0 % (ref 0–10)
BILIRUB SERPL-MCNC: 1 MG/DL (ref 0.1–1)
BUN/CREAT SERPL: 21
CALCIUM SERPL-MCNC: 9.8 MG/DL (ref 8.5–10.1)
CHLORIDE SERPL-SCNC: 101 MMOL/L (ref 98–107)
CO2 SERPL-SCNC: 20 MMOL/L (ref 21–32)
CREAT SERPL-MCNC: 0.81 MG/DL (ref 0.6–1.3)
EOSINOPHIL # BLD AUTO: 0.1 10^3/UL (ref 0–0.3)
EOSINOPHIL NFR BLD AUTO: 3 % (ref 0–10)
GFR SERPLBLD BASED ON 1.73 SQ M-ARVRAT: 104 ML/MIN
GLUCOSE SERPL-MCNC: 153 MG/DL (ref 70–105)
HCT VFR BLD CALC: 32 % (ref 40–54)
HGB BLD-MCNC: 10.9 G/DL (ref 13.3–17.7)
LYMPHOCYTES # BLD AUTO: 0.6 10^3/UL (ref 1–4)
LYMPHOCYTES NFR BLD AUTO: 25 % (ref 12–44)
MANUAL DIFFERENTIAL PERFORMED BLD QL: NO
MCH RBC QN AUTO: 32 PG (ref 25–34)
MCHC RBC AUTO-ENTMCNC: 34 G/DL (ref 32–36)
MCV RBC AUTO: 92 FL (ref 80–99)
MONOCYTES # BLD AUTO: 0.4 10^3/UL (ref 0–1)
MONOCYTES NFR BLD AUTO: 16 % (ref 0–12)
NEUTROPHILS # BLD AUTO: 1.3 10^3/UL (ref 1.8–7.8)
NEUTROPHILS NFR BLD AUTO: 56 % (ref 42–75)
PLATELET # BLD: 65 10^3/UL (ref 130–400)
PMV BLD AUTO: 11.6 FL (ref 9–12.2)
POTASSIUM SERPL-SCNC: 4 MMOL/L (ref 3.6–5)
PROT SERPL-MCNC: 6.9 GM/DL (ref 6.4–8.2)
SODIUM SERPL-SCNC: 132 MMOL/L (ref 135–145)
WBC # BLD AUTO: 2.3 10^3/UL (ref 4.3–11)

## 2023-06-15 PROCEDURE — 80053 COMPREHEN METABOLIC PANEL: CPT

## 2023-06-15 PROCEDURE — 84484 ASSAY OF TROPONIN QUANT: CPT

## 2023-06-15 PROCEDURE — 85025 COMPLETE CBC W/AUTO DIFF WBC: CPT

## 2023-06-15 PROCEDURE — 93005 ELECTROCARDIOGRAM TRACING: CPT

## 2023-06-15 PROCEDURE — 36415 COLL VENOUS BLD VENIPUNCTURE: CPT

## 2023-06-15 PROCEDURE — 71045 X-RAY EXAM CHEST 1 VIEW: CPT

## 2023-06-15 NOTE — DIAGNOSTIC IMAGING REPORT
CHEST 1 VIEW AP/PA ONLY 



INDICATION: CP. 



COMPARISON: CT of the chest on 04/01/2023.



FINDINGS:



Lungs: Normal lung volume. No focal consolidation. Stable

pulmonary vasculature. 



Pleura: No pleural effusion or pneumothorax.



Heart and Mediastinum: Cardiomediastinal silhouette and great

vessels of the thorax are stable.



Osseous Structures and Soft Tissues: No acute osseous

abnormality. Normal soft tissues.



IMPRESSION:

No acute cardiopulmonary process.



 



Dictated by: 



  Dictated on workstation # [id number]

## 2023-06-15 NOTE — ED CHEST PAIN
General


Stated Complaint:  CHEST PAIN


Source:  patient


Exam Limitations:  no limitations





History of Present Illness


Date Seen by Provider:  Armani 15, 2023


Time Seen by Provider:  10:16


Initial Comments


53-year-old male presents to the emergency department today for chest pain.  

Describes it as tension in his left anterior chest that radiates up into his 

neck and shoulder blade and down his arm.  It is worse with movement or 

palpation.  He has never had similar pains in the past.  No new activities but 

he does on a tractor for living.  He denies any fevers chills cough abdominal 

pain or changes in bowel or bladder habits.  He does not have any history of 

cardiac disease.  No shortness of breath.  No unilateral lower extremity pain or

swelling.  His pain is significantly worse with movement of his left upper 

extremity and again direct palpation.





All other systems reviewed and negative except documented per HPI.





Voice recognition software was used to help create this chart





Allergies and Home Medications


Allergies


Coded Allergies:  


     No Known Drug Allergies (Unverified , 7/24/19)





Patient Home Medication List


Home Medication List Reviewed:  Yes


Amoxicillin/Potassium Clav (Amox Tr-K Clv 875-125 mg Tab) 875 Mg-125 Mg Tablet, 

1 EACH PO BID


   Prescribed by: SUMAYA SHAW on 9/8/22 1014


Bimatoprost (Lumigan) 2.5 Ml Drops, 1 DROP OU DAILY, (Reported)


   Entered as Reported by: LAKIA BALBUENA on 7/24/19 0845


Ciprofloxacin HCl (Ciprofloxacin HCl) 500 Mg Tablet, 500 MG PO BID


   Prescribed by: MIAH KARIMI on 10/21/20 1106


Diclofenac Sodium (Diclofenac Sodium) 75 Mg Tablet.dr, 75 MG PO BID


   Prescribed by: MIAH KARIMI on 10/21/20 1106


Hydrocodone Bit/Acetaminophen (HYDROcodone/APAP 7.5/325 TAB) 1 Each Tablet, 1-2 

TAB PO Q4H


   Prescribed by: STEFAN CLEMENT on 7/31/19 0926


Levothyroxine Sodium (Levothyroxine Sodium) 50 Mcg Tablet, 50 MCG PO DAILY, 

(Reported)


   Entered as Reported by: LAKIA BALBUENA on 7/24/19 0845


Lisinopril/Hydrochlorothiazide (Lisinopril-Hctz 10-12.5 mg Tab) 1 Each Tablet, 2

EACH PO DAILY, (Reported)


   Entered as Reported by: LAKIA BALBUENA on 7/24/19 0845


Metronidazole (Flagyl) 500 Mg Tablet, 500 MG PO TID


   Prescribed by: MIAH KARIMI on 10/21/20 1106


Pantoprazole Sodium (Protonix) 40 Mg Tablet.dr, 40 MG PO DAILY


   Prescribed by: STEFAN CLEMENT on 7/31/19 1149





Review of Systems


Review of Systems


Constitutional:  see HPI





Past Medical-Social-Family Hx


Patient Social History


Tobacco Use?:  No


Use of E-Cig and/or Vaping dev:  No


Substance use?:  No





Immunizations Up To Date


First/Initial COVID19 Vaccinat:  2021


Second COVID19 Vaccination Kyle:  2021


Third COVID19 Vaccination Date:  2021





Seasonal Allergies


Seasonal Allergies:  No





Past Medical History


Surgery/Hospitalization HX:  


BACK SX, CIRRHOSIS


Surgeries:  Yes (R knee scope, back sx)


Gallbladder, Orthopedic


Respiratory:  No


Cardiac:  Yes


Hypertension


Neurological:  No


Genitourinary:  No


Gastrointestinal:  Yes (GASTRITIS)


Gastroesophageal Reflux, Hemorrhoids, Esophageal Varices, Cirrhosis


Musculoskeletal:  Yes (BACK SURGERY; R HIP FX; R KNEE SCOPE)


Chronic Back Pain, Fractures


Endocrine:  Yes


Hypothyroidsim


HEENT:  Yes


Glaucoma


Cancer:  No


Psychosocial:  Yes (ALCOHOLISM AND MARIJUANA USE)


Integumentary:  No


Blood Disorders:  No





Family Medical History








SOCIAL HISTORY:


-SMOKING--DENIES USE


-ETOH-- 30 PACK/DAY ALL OF LIFE


-MARIJUANA--DAILY USE





PAST SURGICAL HISTORY:


-07/31/2019--CHOLECYSTECTOMY, WITH EGD AND COLONOSCOPY BY DR. SLOAN


( MODERATE TO SEVERE GASTRITIS, GRADE 3 ESOPHAGEAL VARICES, HEMORRHOIDS)





-RIGHT KNEE SCOPE





-BACK SURGERY





-09/20/2021--RIGHT HIP FRACTURE





Physical Exam


Vital Signs





Vital Signs - First Documented








 6/15/23





 10:17


 


Temp 36.9


 


Pulse 72


 


Resp 15


 


B/P (MAP) 143/82 (102)


 


O2 Delivery Room Air





Capillary Refill :


Height, Weight, BMI


Height: 5'7.00"


Weight: 160lbs. 0.0oz. 72.425615vg; 26.00 BMI


Method:


General Appearance:  No Apparent Distress, WD/WN


HEENT:  Normal ENT Inspection, Pharynx Normal


Neck:  Full Range of Motion, Normal Inspection, Non Tender, Supple


Respiratory:  Lungs Clear, Normal Breath Sounds, No Accessory Muscle Use, No 

Respiratory Distress, Other (Significant tenderness to palpation left anterior 

chest wall in the superior portion, left lateral neck musculature and left 

parascapular region.  There is no crepitus or skin changes.)


Cardiovascular:  Regular Rate, Rhythm, Normal Peripheral Pulses


Gastrointestinal:  Normal Bowel Sounds, No Organomegaly, No Pulsatile Mass, Non 

Tender, Soft


Extremity:  Normal Capillary Refill, Normal Inspection, Normal Range of Motion, 

Non Tender, No Calf Tenderness, No Pedal Edema


Neurologic/Psychiatric:  Alert, Oriented x3, No Motor/Sensory Deficits


Skin:  Normal Color, Warm/Dry


Lymphatic:  No Adenopathy





Procedures/Interventions





   Suture Size:  4-0





Progress/Results/Core Measures


Results/Orders


Lab Results





Laboratory Tests








Test


 6/15/23


10:20 Range/Units


 


 


White Blood Count


 2.3 L


 4.3-11.0


10^3/uL


 


Red Blood Count


 3.46 L


 4.30-5.52


10^6/uL


 


Hemoglobin 10.9 L 13.3-17.7  g/dL


 


Hematocrit 32 L 40-54  %


 


Mean Corpuscular Volume 92  80-99  fL


 


Mean Corpuscular Hemoglobin 32  25-34  pg


 


Mean Corpuscular Hemoglobin


Concent 34 


 32-36  g/dL





 


Red Cell Distribution Width 13.9  10.0-14.5  %


 


Platelet Count


 65 L


 130-400


10^3/uL


 


Mean Platelet Volume 11.6  9.0-12.2  fL


 


Immature Granulocyte % (Auto) 0   %


 


Neutrophils (%) (Auto) 56  42-75  %


 


Lymphocytes (%) (Auto) 25  12-44  %


 


Monocytes (%) (Auto) 16 H 0-12  %


 


Eosinophils (%) (Auto) 3  0-10  %


 


Basophils (%) (Auto) 0  0-10  %


 


Neutrophils # (Auto)


 1.3 L


 1.8-7.8


10^3/uL


 


Lymphocytes # (Auto)


 0.6 L


 1.0-4.0


10^3/uL


 


Monocytes # (Auto)


 0.4 


 0.0-1.0


10^3/uL


 


Eosinophils # (Auto)


 0.1 


 0.0-0.3


10^3/uL


 


Basophils # (Auto)


 0.0 


 0.0-0.1


10^3/uL


 


Immature Granulocyte # (Auto)


 0.0 


 0.0-0.1


10^3/uL


 


Percent Immature Platelet


Fraction 8.4 H


 0.0-7.6  %





 


Sodium Level 132 L 135-145  MMOL/L


 


Potassium Level 4.0  3.6-5.0  MMOL/L


 


Chloride Level 101    MMOL/L


 


Carbon Dioxide Level 20 L 21-32  MMOL/L


 


Anion Gap 11  5-14  MMOL/L


 


Blood Urea Nitrogen 17  7-18  MG/DL


 


Creatinine


 0.81 


 0.60-1.30


MG/DL


 


Estimat Glomerular Filtration


Rate 104 


  





 


BUN/Creatinine Ratio 21   


 


Glucose Level 153 H   MG/DL


 


Calcium Level 9.8  8.5-10.1  MG/DL


 


Corrected Calcium 9.9  8.5-10.1  MG/DL


 


Total Bilirubin 1.0  0.1-1.0  MG/DL


 


Aspartate Amino Transf


(AST/SGOT) 23 


 5-34  U/L





 


Alanine Aminotransferase


(ALT/SGPT) 14 


 0-55  U/L





 


Alkaline Phosphatase 91    U/L


 


Troponin I < 0.30  <0.30  NG/ML


 


Total Protein 6.9  6.4-8.2  GM/DL


 


Albumin 3.9  3.2-4.5  GM/DL








My Orders





Orders - SUMMER SIDHU DO


Cbc With Automated Diff (6/15/23 10:21)


Chest 1 View Ap/Pa Only (6/15/23 10:21)


Ekg Tracing (6/15/23 10:21)


Comprehensive Metabolic Panel (6/15/23 10:21)


Aspirin Chewable Tablet (Baby Aspirin Ch (6/15/23 10:30)


Ed Iv/Invasive Line Start (6/15/23 10:21)


Troponin I Fs (6/15/23 10:21)


Ketorolac Injection (Toradol Injection) (6/15/23 10:30)





Medications Given in ED





Current Medications








 Medications  Dose


 Ordered  Sig/Jose


 Route  Start Time


 Stop Time Status Last Admin


Dose Admin


 


 Aspirin  324 mg  ONCE  ONCE


 PO  6/15/23 10:30


 6/15/23 10:31 DC 6/15/23 10:34


324 MG


 


 Ketorolac


 Tromethamine  15 mg  ONCE  ONCE


 IVP  6/15/23 10:30


 6/15/23 10:31 DC 6/15/23 10:33


15 MG








Vital Signs/I&O











 6/15/23





 10:17


 


Temp 36.9


 


Pulse 72


 


Resp 15


 


B/P (MAP) 143/82 (102)


 


O2 Delivery Room Air








Comment


Sinus rhythm with a rate of 65 bpm.  Normal intervals.  Normal axis.  No ST or T

wave abnormalities.  No ectopy.  No STEMI.





Departure


Communication (Admissions)


Patient is hemodynamically stable.  He has very clear musculoskeletal pain on 

exam.  Cardiac work-up is negative.  Interestingly he does have pancytopenia 

with low white count, hemoglobin and platelets.  Did advise that he follow this 

up with his primary doctor however it likely is contributory to his current 

symptoms.





Impression





   Primary Impression:  


   Chest wall pain


Disposition:  01 HOME, SELF-CARE


Condition:  Stable





Departure-Patient Inst.


Referrals:  


DESIREE WHITNEY (PCP)


Primary Care Physician








TIKA JONES DO (Family)


Primary Care Physician


Patient Instructions:  Chest Pain That Is Not Caused by the Heart (DC)





Add. Discharge Instructions:  


Use the Toradol as needed.  I believe your pain is likely musculoskeletal not 

coming from your heart or your lungs at this time.  Recommend you follow-up with

your primary doctor as discussed for further evaluation and testing 

recommendations.  Your blood counts are slightly low.  This is likely 

nonspecific but will need to be followed up by your primary doctor.  Return to 

the emergency department for any severe concerns.


Scripts


Ketorolac Tromethamine (Ketorolac Tromethamine) 10 Mg Tablet


10 MG PO TID for Pain for 3 Days, #9 TAB


   Prov: SUMMER SIDHU DO         6/15/23











SUMMER SIDHU DO            Armani 15, 2023 10:24